# Patient Record
Sex: MALE | Race: WHITE | URBAN - METROPOLITAN AREA
[De-identification: names, ages, dates, MRNs, and addresses within clinical notes are randomized per-mention and may not be internally consistent; named-entity substitution may affect disease eponyms.]

---

## 2017-03-09 PROBLEM — I10 ESSENTIAL HYPERTENSION: Status: ACTIVE | Noted: 2017-03-09

## 2017-03-10 ENCOUNTER — OFFICE VISIT (OUTPATIENT)
Dept: FAMILY MEDICINE CLINIC | Facility: CLINIC | Age: 77
End: 2017-03-10

## 2017-03-10 VITALS
DIASTOLIC BLOOD PRESSURE: 78 MMHG | HEIGHT: 66 IN | SYSTOLIC BLOOD PRESSURE: 112 MMHG | TEMPERATURE: 98 F | WEIGHT: 154 LBS | HEART RATE: 60 BPM | BODY MASS INDEX: 24.75 KG/M2

## 2017-03-10 DIAGNOSIS — Z00.00 HEALTH CARE MAINTENANCE: Primary | ICD-10-CM

## 2017-03-10 DIAGNOSIS — I10 ESSENTIAL HYPERTENSION: ICD-10-CM

## 2017-03-10 PROCEDURE — G0439 PPPS, SUBSEQ VISIT: HCPCS | Performed by: FAMILY MEDICINE

## 2017-03-10 RX ORDER — ENALAPRIL MALEATE 10 MG/1
10 TABLET ORAL DAILY
Qty: 90 TABLET | Refills: 3 | Status: SHIPPED | OUTPATIENT
Start: 2017-03-10 | End: 2017-08-03

## 2017-03-10 NOTE — PROGRESS NOTES
[facility] [place]  PROGRESS NOTE  Chief Complaint:   No chief complaint on file.       HPI:   This is a 65year old male coming in for yearly physical.  Overall patient states he is been doing quite well takes his enalapril and daily basis for h aches, back pain, joint pain, swelling or stiffness. NEUROLOGICAL:  Denies headache, seizures, dizziness, syncope, paralysis, , numbness or tingling in the extremities,change in bowel or bladder control. HEMATOLOGIC:  Denies anemia, bleeding or bruising. 2+ dorsalis pedis pulses bilaterally. NEURO:  No deficit, normal gait, strength and tone, sensory intact, normal reflexes.     ASSESSMENT AND PLAN:   Diagnoses and all orders for this visit:    Health care maintenance  -     Comp Metabolic Panel (14) [E];

## 2017-03-16 ENCOUNTER — LAB ENCOUNTER (OUTPATIENT)
Dept: LAB | Age: 77
End: 2017-03-16
Attending: FAMILY MEDICINE
Payer: MEDICARE

## 2017-03-16 DIAGNOSIS — I10 ESSENTIAL HYPERTENSION: ICD-10-CM

## 2017-03-16 DIAGNOSIS — Z00.00 HEALTH CARE MAINTENANCE: ICD-10-CM

## 2017-03-16 LAB
ALBUMIN SERPL-MCNC: 3.9 G/DL (ref 3.5–4.8)
ALP LIVER SERPL-CCNC: 54 U/L (ref 45–117)
ALT SERPL-CCNC: 20 U/L (ref 17–63)
AST SERPL-CCNC: 16 U/L (ref 15–41)
BASOPHILS # BLD AUTO: 0.03 X10(3) UL (ref 0–0.1)
BASOPHILS NFR BLD AUTO: 0.5 %
BILIRUB SERPL-MCNC: 1.1 MG/DL (ref 0.1–2)
BUN BLD-MCNC: 13 MG/DL (ref 8–20)
CALCIUM BLD-MCNC: 9 MG/DL (ref 8.3–10.3)
CHLORIDE: 102 MMOL/L (ref 101–111)
CHOLEST SMN-MCNC: 136 MG/DL (ref ?–200)
CO2: 29 MMOL/L (ref 22–32)
COMPLEXED PSA SERPL-MCNC: 0.44 NG/ML (ref 0.01–4)
CREAT BLD-MCNC: 0.72 MG/DL (ref 0.7–1.3)
EOSINOPHIL # BLD AUTO: 0.08 X10(3) UL (ref 0–0.3)
EOSINOPHIL NFR BLD AUTO: 1.3 %
ERYTHROCYTE [DISTWIDTH] IN BLOOD BY AUTOMATED COUNT: 13.1 % (ref 11.5–16)
GLUCOSE BLD-MCNC: 84 MG/DL (ref 70–99)
HCT VFR BLD AUTO: 48.1 % (ref 37–53)
HDLC SERPL-MCNC: 56 MG/DL (ref 45–?)
HDLC SERPL: 2.43 {RATIO} (ref ?–4.97)
HGB BLD-MCNC: 16.7 G/DL (ref 13–17)
IMMATURE GRANULOCYTE COUNT: 0.07 X10(3) UL (ref 0–1)
IMMATURE GRANULOCYTE RATIO %: 1.1 %
LDLC SERPL CALC-MCNC: 72 MG/DL (ref ?–130)
LYMPHOCYTES # BLD AUTO: 1.37 X10(3) UL (ref 0.9–4)
LYMPHOCYTES NFR BLD AUTO: 21.8 %
M PROTEIN MFR SERPL ELPH: 7.1 G/DL (ref 6.1–8.3)
MCH RBC QN AUTO: 32.7 PG (ref 27–33.2)
MCHC RBC AUTO-ENTMCNC: 34.7 G/DL (ref 31–37)
MCV RBC AUTO: 94.1 FL (ref 80–99)
MONOCYTES # BLD AUTO: 0.76 X10(3) UL (ref 0.1–0.6)
MONOCYTES NFR BLD AUTO: 12.1 %
NEUTROPHIL ABS PRELIM: 3.98 X10 (3) UL (ref 1.3–6.7)
NEUTROPHILS # BLD AUTO: 3.98 X10(3) UL (ref 1.3–6.7)
NEUTROPHILS NFR BLD AUTO: 63.2 %
NONHDLC SERPL-MCNC: 80 MG/DL (ref ?–130)
PLATELET # BLD AUTO: 228 10(3)UL (ref 150–450)
POTASSIUM SERPL-SCNC: 4.2 MMOL/L (ref 3.6–5.1)
RBC # BLD AUTO: 5.11 X10(6)UL (ref 3.8–5.8)
RED CELL DISTRIBUTION WIDTH-SD: 45.6 FL (ref 35.1–46.3)
SODIUM SERPL-SCNC: 138 MMOL/L (ref 136–144)
TRIGLYCERIDES: 40 MG/DL (ref ?–150)
VLDL: 8 MG/DL (ref 5–40)
WBC # BLD AUTO: 6.3 X10(3) UL (ref 4–13)

## 2017-03-16 PROCEDURE — 85025 COMPLETE CBC W/AUTO DIFF WBC: CPT

## 2017-03-16 PROCEDURE — 80053 COMPREHEN METABOLIC PANEL: CPT

## 2017-03-16 PROCEDURE — 80061 LIPID PANEL: CPT

## 2017-03-17 ENCOUNTER — TELEPHONE (OUTPATIENT)
Dept: FAMILY MEDICINE CLINIC | Facility: CLINIC | Age: 77
End: 2017-03-17

## 2017-03-17 NOTE — TELEPHONE ENCOUNTER
----- Message from Enid Espinosa MD sent at 3/16/2017  7:39 PM CDT -----  Overall labs good  No changes in  meds  May refill x 6 months if needed

## 2017-06-28 ENCOUNTER — TELEPHONE (OUTPATIENT)
Dept: FAMILY MEDICINE CLINIC | Facility: CLINIC | Age: 77
End: 2017-06-28

## 2017-06-28 NOTE — TELEPHONE ENCOUNTER
Wife has been monitoring his BP and states it is usually normal but since Monday it has been running high. Systolic Number have been in the 190's and up. Today it was 198/90. Patient states he feels fine. Denies chest pain or headaches.   Please advise

## 2017-06-28 NOTE — TELEPHONE ENCOUNTER
Wife informed of below recommendations.  Expressed understanding   Wife will call tomorrow to set up a f/u appt with PCP

## 2017-06-28 NOTE — TELEPHONE ENCOUNTER
Chart reviewed     Recommend increase dosing of Enalopril to one tab twice daily     F/u appointment with dr. Ilene Majano.

## 2017-06-29 ENCOUNTER — OFFICE VISIT (OUTPATIENT)
Dept: FAMILY MEDICINE CLINIC | Facility: CLINIC | Age: 77
End: 2017-06-29

## 2017-06-29 VITALS
DIASTOLIC BLOOD PRESSURE: 98 MMHG | HEIGHT: 66.5 IN | TEMPERATURE: 98 F | WEIGHT: 155.5 LBS | RESPIRATION RATE: 18 BRPM | BODY MASS INDEX: 24.69 KG/M2 | HEART RATE: 66 BPM | SYSTOLIC BLOOD PRESSURE: 196 MMHG

## 2017-06-29 DIAGNOSIS — I10 ESSENTIAL HYPERTENSION: Primary | ICD-10-CM

## 2017-06-29 PROCEDURE — 99214 OFFICE O/P EST MOD 30 MIN: CPT | Performed by: FAMILY MEDICINE

## 2017-06-29 RX ORDER — AMLODIPINE BESYLATE 5 MG/1
5 TABLET ORAL DAILY
Qty: 30 TABLET | Refills: 0 | Status: SHIPPED | OUTPATIENT
Start: 2017-06-29 | End: 2017-07-06

## 2017-06-29 NOTE — PATIENT INSTRUCTIONS
Start Norvasc 1 tab each evening   Increase enalopril to 2 tabs each am  Limit caffeine and alcohol  Home BP monitor , call numbers 7/3  Daily baby asprin 81 mg     TO ER IF ANY CHEST PAIN< SHORTNESS OF BREATH OR SEVERE HEADACHE

## 2017-06-30 NOTE — PROGRESS NOTES
[address] [place] [address]  PROGRESS NOTE  Chief Complaint:   Patient presents with: Other: high BP starting on 6/26      HPI:   This is a 65year old male coming in for evaluation for elevated blood pressure.   Patient was on vacation a few weeks ago d g/dL   Sodium 138 136 - 144 mmol/L   Potassium 4.2 3.6 - 5.1 mmol/L   Chloride 102 101 - 111 mmol/L   CO2 29.0 22.0 - 32.0 mmol/L   -LIPID PANEL   Result Value Ref Range   Cholesterol, Total 136 <200 mg/dL   Triglycerides 40 <150 mg/dL   HDL Cholesterol 56 mouth 2 (two) times daily.  ) Disp: 90 tablet Rfl: 3      Counseling given: Not Answered       REVIEW OF SYSTEMS:   CONSTITUTIONAL:  Denies unusual weight gain/loss, fever, chills,  SEE HPI  EENT:  Eyes:  Denies eye pain, visual loss, blurred vision, doubl Ears: External normal. Nose: patent, no nasal discharge Throat:  No tonsillar erythema or exudate. Mouth:  No oral lesions or ulcerations, good dentition. NECK: Supple, , no JVD, no thyromegaly. SKIN: No rashes, no skin lesion, no bruising, good turgor.

## 2017-07-03 ENCOUNTER — TELEPHONE (OUTPATIENT)
Dept: FAMILY MEDICINE CLINIC | Facility: CLINIC | Age: 77
End: 2017-07-03

## 2017-07-03 NOTE — TELEPHONE ENCOUNTER
BP stabilizing , but still to high  Increase Norvasc to 10 mg each day   Pt has 5 mg tab, take 2 together each evening

## 2017-07-06 ENCOUNTER — OFFICE VISIT (OUTPATIENT)
Dept: FAMILY MEDICINE CLINIC | Facility: CLINIC | Age: 77
End: 2017-07-06

## 2017-07-06 VITALS
HEART RATE: 62 BPM | DIASTOLIC BLOOD PRESSURE: 76 MMHG | BODY MASS INDEX: 25.56 KG/M2 | SYSTOLIC BLOOD PRESSURE: 132 MMHG | HEIGHT: 65.5 IN | WEIGHT: 155.25 LBS | RESPIRATION RATE: 12 BRPM | TEMPERATURE: 98 F

## 2017-07-06 DIAGNOSIS — I10 ESSENTIAL HYPERTENSION: Primary | ICD-10-CM

## 2017-07-06 PROCEDURE — 99213 OFFICE O/P EST LOW 20 MIN: CPT | Performed by: FAMILY MEDICINE

## 2017-07-06 RX ORDER — AMLODIPINE BESYLATE 5 MG/1
TABLET ORAL
Qty: 60 TABLET | Refills: 3 | Status: SHIPPED | OUTPATIENT
Start: 2017-07-06 | End: 2017-11-13

## 2017-07-07 NOTE — PROGRESS NOTES
[address]  PROGRESS NOTE  Chief Complaint:   Patient presents with: Follow - Up: blood pressure follow up      HPI:   This is a 65year old male coming in for follow-up of hypertension.   Patient started on Norvasc 5 mg to his previous 228.0 150.0 - 450.0 10(3)uL   MCV 94.1 80.0 - 99.0 fL   MCH 32.7 27.0 - 33.2 pg   MCHC 34.7 31.0 - 37.0 g/dL   RDW 13.1 11.5 - 16.0 %   RDW-SD 45.6 35.1 - 46.3 fL   Neutrophil Absolute Prelim 3.98 1.30 - 6.70 x10 (3) uL   Neutrophil Absolute 3.98 1.30 - 6. sputum. GASTROINTESTINAL:  Denies abdominal pain, nausea, vomiting, constipation, diarrhea, or blood in stool. MUSCULOSKELETAL:  Denies weakness, muscle aches, back pain, joint pain, swelling or stiffness.   NEUROLOGICAL:  Denies headache, seizures, dizzi visit:    Essential hypertension    Other orders  -     AmLODIPine Besylate 5 MG Oral Tab; 2 tabs each evening    A/P: Hypertension improved with addition of Norvasc 10 mg daily.   Recommend patient continue with current medications increase in symptoms hari

## 2017-07-21 ENCOUNTER — TELEPHONE (OUTPATIENT)
Dept: FAMILY MEDICINE CLINIC | Facility: CLINIC | Age: 77
End: 2017-07-21

## 2017-07-21 NOTE — TELEPHONE ENCOUNTER
Patient's wife Ashley Celestin calling with 2 weeks of BP readings as requested by Dr. Riley Pleasant:    7/7  -   152/74  7/8  -   147/74  7/9  -   157/67  7/10  -  153/68  7/11  -  142/67  7/12  -  141/66  7/13  -  149/74  7/14  -  169/76  7/15  -  142/65  7/16  -  137/6

## 2017-07-21 NOTE — TELEPHONE ENCOUNTER
Blood pressures a little elevated, but stable. Please let patient know and that he needs to keep his recheck with Dr. Lela Sinclair.  Teodora Winters, 07/21/17, 12:11 PM

## 2017-08-03 ENCOUNTER — TELEPHONE (OUTPATIENT)
Dept: FAMILY MEDICINE CLINIC | Facility: CLINIC | Age: 77
End: 2017-08-03

## 2017-08-03 RX ORDER — ENALAPRIL MALEATE 10 MG/1
10 TABLET ORAL 2 TIMES DAILY
Qty: 180 TABLET | Refills: 3 | Status: SHIPPED | OUTPATIENT
Start: 2017-08-03 | End: 2017-08-17

## 2017-08-03 RX ORDER — HYDROCHLOROTHIAZIDE 25 MG/1
25 TABLET ORAL DAILY
Qty: 30 TABLET | Refills: 3 | Status: SHIPPED | OUTPATIENT
Start: 2017-08-03 | End: 2017-11-28

## 2017-08-03 NOTE — TELEPHONE ENCOUNTER
BP still higher than desired  Will add HCTZ 25 mg q am, orders in  BP check 2-3 weeks here  Continue home BP monitoring, bring numbers with

## 2017-08-03 NOTE — TELEPHONE ENCOUNTER
LOV: 7/6/17  Last Refill:3/10/17    Wife states that pt blood pressure is still elevated most days- yesterday 151/67 and today 137/60. Wife wanted to verify that he is taking the correct dosage and see if dr wanted to make any changes to meds?

## 2017-08-03 NOTE — TELEPHONE ENCOUNTER
Pt wife called back- I advised that a new medication has been added. I also advised to return to the office in 2-3 weeks for a BP check and to bring numbers from home monitoring in. Pt wife verbalized understanding.     Ashley Parra, 08/03/17, 2:44 PM

## 2017-08-17 ENCOUNTER — OFFICE VISIT (OUTPATIENT)
Dept: FAMILY MEDICINE CLINIC | Facility: CLINIC | Age: 77
End: 2017-08-17

## 2017-08-17 VITALS
WEIGHT: 156 LBS | HEART RATE: 58 BPM | HEIGHT: 66 IN | TEMPERATURE: 98 F | BODY MASS INDEX: 25.07 KG/M2 | RESPIRATION RATE: 12 BRPM | SYSTOLIC BLOOD PRESSURE: 126 MMHG | DIASTOLIC BLOOD PRESSURE: 70 MMHG

## 2017-08-17 DIAGNOSIS — I10 ESSENTIAL HYPERTENSION: Primary | ICD-10-CM

## 2017-08-17 PROCEDURE — 99213 OFFICE O/P EST LOW 20 MIN: CPT | Performed by: FAMILY MEDICINE

## 2017-08-17 RX ORDER — ENALAPRIL MALEATE 10 MG/1
10 TABLET ORAL 2 TIMES DAILY
Qty: 180 TABLET | Refills: 3 | Status: SHIPPED | OUTPATIENT
Start: 2017-08-17 | End: 2018-02-02

## 2017-08-17 NOTE — PATIENT INSTRUCTIONS
Continue current meds  Take hydrochlorthiazide 25 mg in am  With enalopril  Take Norvasc in the evening 5 mg

## 2017-08-17 NOTE — PROGRESS NOTES
[facility] [place]  PROGRESS NOTE  Chief Complaint:   Patient presents with:  Blood Pressure: 6 week check up       HPI:   This is a 65year old male coming in for follow-up of his hypertension.   Patient is feeling much better since his medicati Neutrophil Absolute 3.98 1.30 - 6.70 x10(3) uL   Lymphocyte Absolute 1.37 0.90 - 4.00 x10(3) uL   Monocyte Absolute 0.76 (H) 0.10 - 0.60 x10(3) uL   Eosinophil Absolute 0.08 0.00 - 0.30 x10(3) uL   Basophil Absolute 0.03 0.00 - 0.10 x10(3) uL   Immature weakness, muscle aches, back pain, joint pain, swelling or stiffness. NEUROLOGICAL:  Denies headache, seizures, dizziness, syncope, paralysis, , numbness or tingling in the extremities,change in bowel or bladder control.   HEMATOLOGIC:  Denies anemia, blee Oral Tab; Take 1 tablet (10 mg total) by mouth 2 (two) times daily. A/P: Patient here for follow-up of hypertension. Prior medication adjustments his blood pressures been running 170 190/100.   Now patient is doing very well with blood pressures running

## 2017-11-02 ENCOUNTER — TELEPHONE (OUTPATIENT)
Dept: FAMILY MEDICINE CLINIC | Facility: CLINIC | Age: 77
End: 2017-11-02

## 2017-11-02 NOTE — TELEPHONE ENCOUNTER
Pt's wife had a question regarding mail order. She stated that she was told a few weeks ago that it was too soon to refill pt's enalapril by the mail order pharmacy. She states that he only has about 2 weeks worth on meds left.  I advised her to call the ma

## 2017-11-13 ENCOUNTER — TELEPHONE (OUTPATIENT)
Dept: FAMILY MEDICINE CLINIC | Facility: CLINIC | Age: 77
End: 2017-11-13

## 2017-11-13 NOTE — TELEPHONE ENCOUNTER
Verbal permission given from patient to speak with his wife [place] Session as he cannot hear very well. Wife states patient is almost out of his Amlodipine; has enough for tomorrow.   Wife wondering if Dr. Evelyn Stein wanted patient to continue taking this medicatio

## 2017-11-14 RX ORDER — AMLODIPINE BESYLATE 5 MG/1
TABLET ORAL
Qty: 60 TABLET | Refills: 0 | Status: SHIPPED | OUTPATIENT
Start: 2017-11-14 | End: 2017-12-04

## 2017-11-14 NOTE — TELEPHONE ENCOUNTER
Dr. Nicolas Rizzo is out of the office today. Prescription sent to pharmacy.  Teodora Winters, 11/14/17, 9:57 AM

## 2017-11-28 ENCOUNTER — TELEPHONE (OUTPATIENT)
Dept: FAMILY MEDICINE CLINIC | Facility: CLINIC | Age: 77
End: 2017-11-28

## 2017-11-28 RX ORDER — HYDROCHLOROTHIAZIDE 25 MG/1
25 TABLET ORAL DAILY
Qty: 30 TABLET | Refills: 3 | Status: SHIPPED | OUTPATIENT
Start: 2017-11-28 | End: 2017-12-04

## 2017-11-28 NOTE — TELEPHONE ENCOUNTER
Pt out of hydrochlorothiazide. Not sure if he needs a refill or not. Last appt 8/17/17, told to follow up in 3 months. From last appt: \"This is a 65year old male coming in for follow-up of his hypertension.   Patient is feeling much better since h

## 2017-11-28 NOTE — TELEPHONE ENCOUNTER
Spoke w/ Anthony Franco pt's wife because he is hard of hearing. Casimir Bloch was in the room with Anthony Franco. Informed her of recommendations and reminded her of upcoming appt.

## 2017-12-04 ENCOUNTER — OFFICE VISIT (OUTPATIENT)
Dept: FAMILY MEDICINE CLINIC | Facility: CLINIC | Age: 77
End: 2017-12-04

## 2017-12-04 VITALS
HEIGHT: 66 IN | TEMPERATURE: 98 F | DIASTOLIC BLOOD PRESSURE: 72 MMHG | RESPIRATION RATE: 14 BRPM | HEART RATE: 62 BPM | SYSTOLIC BLOOD PRESSURE: 120 MMHG | BODY MASS INDEX: 24.61 KG/M2 | WEIGHT: 153.13 LBS

## 2017-12-04 DIAGNOSIS — I10 ESSENTIAL HYPERTENSION: Primary | ICD-10-CM

## 2017-12-04 DIAGNOSIS — R26.89 BALANCE DISORDER: ICD-10-CM

## 2017-12-04 PROCEDURE — 99214 OFFICE O/P EST MOD 30 MIN: CPT | Performed by: FAMILY MEDICINE

## 2017-12-04 PROCEDURE — G0008 ADMIN INFLUENZA VIRUS VAC: HCPCS | Performed by: FAMILY MEDICINE

## 2017-12-04 PROCEDURE — 90653 IIV ADJUVANT VACCINE IM: CPT | Performed by: FAMILY MEDICINE

## 2017-12-04 RX ORDER — AMLODIPINE BESYLATE 5 MG/1
TABLET ORAL
Qty: 60 TABLET | Refills: 6 | Status: SHIPPED | OUTPATIENT
Start: 2017-12-04 | End: 2018-05-09

## 2017-12-04 RX ORDER — HYDROCHLOROTHIAZIDE 25 MG/1
25 TABLET ORAL DAILY
Qty: 30 TABLET | Refills: 6 | Status: SHIPPED | OUTPATIENT
Start: 2017-12-04 | End: 2018-07-24

## 2017-12-04 NOTE — PROGRESS NOTES
[facility] [place]  PROGRESS NOTE  Chief Complaint:   Patient presents with:  Blood Pressure      HPI:   This is a 65year old male coming in for follow-up of blood pressure medications.   Since last increase in medication patient's noted that so mmol/L   -LIPID PANEL   Result Value Ref Range   Cholesterol, Total 136 <200 mg/dL   Triglycerides 40 <150 mg/dL   HDL Cholesterol 56 >45 mg/dL   LDL Cholesterol 72 <130 mg/dL   VLDL 8 5 - 40 mg/dL   Chol/HDL Ratio 2.43 <4.97   Non HDL Chol 80 <130 mg/dL (10 mg total) by mouth 2 (two) times daily. Disp: 180 tablet Rfl: 3      Counseling given: Not Answered       REVIEW OF SYSTEMS:   CONSTITUTIONAL:  Denies unusual weight gain/loss, fever, chills, or fatigue.  SEE HPI  EENT:  Eyes:  Denies eye pain, visual l conjunctivae clear bilaterally, no eye discharge Ears: External normal. Nose: patent, no nasal discharge Throat:  No tonsillar erythema or exudate. Mouth:  No oral lesions or ulcerations, good dentition. NECK: Supple, , no JVD, no thyromegaly.   SKIN: No call with any questions, complications, allergies, or worsening or changing symptoms. Patient is to call with any side effects or complications from the treatments as a result of today.      Problem List:  Patient Active Problem List:     Essential hyperte

## 2017-12-04 NOTE — PATIENT INSTRUCTIONS
Increase water intake, 3-5 glasses each day  Split amlodopine, 1 tab morning , 1  In the evening  Continue regular walking  Refill meds as needed

## 2018-02-02 ENCOUNTER — TELEPHONE (OUTPATIENT)
Dept: FAMILY MEDICINE CLINIC | Facility: CLINIC | Age: 78
End: 2018-02-02

## 2018-02-02 RX ORDER — ENALAPRIL MALEATE 10 MG/1
10 TABLET ORAL 2 TIMES DAILY
Qty: 180 TABLET | Refills: 3 | Status: SHIPPED | OUTPATIENT
Start: 2018-02-02 | End: 2018-02-08

## 2018-02-02 NOTE — TELEPHONE ENCOUNTER
Please advise refill. Spoke with pt's wife and she stated that he only has 15 enalapril left and would like a refill sent to [facility]. Last Rx 8/17/17 refilled x 1 year. Should still have refills.     Future appt:  None  Last Appointment:  12/4/2017    Tim

## 2018-02-08 ENCOUNTER — TELEPHONE (OUTPATIENT)
Dept: FAMILY MEDICINE CLINIC | Facility: CLINIC | Age: 78
End: 2018-02-08

## 2018-02-08 RX ORDER — ENALAPRIL MALEATE 10 MG/1
10 TABLET ORAL 2 TIMES DAILY
Qty: 180 TABLET | Refills: 3 | Status: SHIPPED | OUTPATIENT
Start: 2018-02-08 | End: 2018-02-16

## 2018-02-08 NOTE — TELEPHONE ENCOUNTER
Refill was sent to wrong pharmacy on 2/2/18. Sent to correct pharmacy today. Pt's wife notified and verbalized understanding. She will call for short refill if he needs one.

## 2018-02-16 ENCOUNTER — TELEPHONE (OUTPATIENT)
Dept: FAMILY MEDICINE CLINIC | Facility: CLINIC | Age: 78
End: 2018-02-16

## 2018-02-16 RX ORDER — ENALAPRIL MALEATE 10 MG/1
10 TABLET ORAL 2 TIMES DAILY
Qty: 180 TABLET | Refills: 1 | Status: SHIPPED | OUTPATIENT
Start: 2018-02-16 | End: 2018-03-15

## 2018-02-16 NOTE — TELEPHONE ENCOUNTER
Spoke with wife and she states she received a letter today from [facility] dated 2/8/18 that it is too soon to refill his enalapril. Doctor Deny Draper sent a new script on 2/8/18 with updated dosage.    Wife informed I would try to contact [facility] to find out

## 2018-02-16 NOTE — TELEPHONE ENCOUNTER
Script resent to mail order with explanation  Should receive med in mid march before other script out

## 2018-03-15 ENCOUNTER — OFFICE VISIT (OUTPATIENT)
Dept: FAMILY MEDICINE CLINIC | Facility: CLINIC | Age: 78
End: 2018-03-15

## 2018-03-15 VITALS
WEIGHT: 156 LBS | SYSTOLIC BLOOD PRESSURE: 132 MMHG | TEMPERATURE: 99 F | BODY MASS INDEX: 25.68 KG/M2 | HEART RATE: 72 BPM | HEIGHT: 65.5 IN | DIASTOLIC BLOOD PRESSURE: 72 MMHG

## 2018-03-15 DIAGNOSIS — Z00.00 HEALTH CARE MAINTENANCE: Primary | ICD-10-CM

## 2018-03-15 DIAGNOSIS — D12.3 ADENOMATOUS POLYP OF TRANSVERSE COLON: ICD-10-CM

## 2018-03-15 DIAGNOSIS — H90.6 MIXED CONDUCTIVE AND SENSORINEURAL HEARING LOSS OF BOTH EARS: ICD-10-CM

## 2018-03-15 DIAGNOSIS — Z12.5 ENCOUNTER FOR SCREENING FOR MALIGNANT NEOPLASM OF PROSTATE: ICD-10-CM

## 2018-03-15 DIAGNOSIS — I10 ESSENTIAL HYPERTENSION: ICD-10-CM

## 2018-03-15 PROBLEM — R26.89 BALANCE DISORDER: Status: RESOLVED | Noted: 2017-12-04 | Resolved: 2018-03-15

## 2018-03-15 PROCEDURE — G0009 ADMIN PNEUMOCOCCAL VACCINE: HCPCS | Performed by: FAMILY MEDICINE

## 2018-03-15 PROCEDURE — 90670 PCV13 VACCINE IM: CPT | Performed by: FAMILY MEDICINE

## 2018-03-15 PROCEDURE — G0439 PPPS, SUBSEQ VISIT: HCPCS | Performed by: FAMILY MEDICINE

## 2018-03-15 RX ORDER — ENALAPRIL MALEATE 10 MG/1
10 TABLET ORAL 2 TIMES DAILY
Qty: 180 TABLET | Refills: 1 | Status: SHIPPED | OUTPATIENT
Start: 2018-03-15 | End: 2018-09-07

## 2018-03-15 RX ORDER — ERYTHROMYCIN 5 MG/G
OINTMENT OPHTHALMIC
COMMUNITY
Start: 2018-03-09 | End: 2018-10-04 | Stop reason: ALTCHOICE

## 2018-03-15 NOTE — PATIENT INSTRUCTIONS
Continue current meds  Schedule fasting labs, 12 hours water only  Due for colonoscopy, see referral  Refill meds as needed  Resume daily walks  Restart asprin 81 mg daily

## 2018-03-16 NOTE — PROGRESS NOTES
[facility] [place]  PROGRESS NOTE  Chief Complaint:   Patient presents with:  Physical      HPI:   This is a 77year old male coming in for yearly physical.  Patient overall feels like she is doing very well.   Patient's had increasing difficulti -LIPID PANEL   Result Value Ref Range   Cholesterol, Total 136 <200 mg/dL   Triglycerides 40 <150 mg/dL   HDL Cholesterol 56 >45 mg/dL   LDL Cholesterol 72 <130 mg/dL   VLDL 8 5 - 40 mg/dL   Chol/HDL Ratio 2.43 <4.97   Non HDL Chol 80 <130 mg/dL   -PSA S tablet Rfl: 6   erythromycin 5 MG/GM Ophthalmic Ointment  Disp:  Rfl:    aspirin 81 MG Oral Tab Take 81 mg by mouth daily.  Disp:  Rfl:       Counseling given: Not Answered       REVIEW OF SYSTEMS:   CONSTITUTIONAL:  Denies unusual weight gain/loss, fever, icterus, conjunctivae clear bilaterally, no eye discharge Ears: External normal. Nose: patent, no nasal discharge Throat:  No tonsillar erythema or exudate. Mouth:  No oral lesions or ulcerations, good dentition. NECK: Supple, , no JVD, no thyromegaly. follow-up in 6 months.     Health Maintenance:  Pneumococcal PPSV23/PCV13 65+ Years / Low and Medium Risk(1 of 2 - PCV13) due on 03/06/2004  Fall Risk Screening due on 03/10/2018  Annual Physical due on 03/10/2018    Patient/Caregiver Education: Patient/Car

## 2018-04-28 ENCOUNTER — LABORATORY ENCOUNTER (OUTPATIENT)
Dept: LAB | Age: 78
End: 2018-04-28
Attending: FAMILY MEDICINE
Payer: MEDICARE

## 2018-04-28 DIAGNOSIS — Z12.5 ENCOUNTER FOR SCREENING FOR MALIGNANT NEOPLASM OF PROSTATE: ICD-10-CM

## 2018-04-28 DIAGNOSIS — I10 ESSENTIAL HYPERTENSION: ICD-10-CM

## 2018-04-28 DIAGNOSIS — Z00.00 HEALTH CARE MAINTENANCE: ICD-10-CM

## 2018-04-28 DIAGNOSIS — D12.3 ADENOMATOUS POLYP OF TRANSVERSE COLON: ICD-10-CM

## 2018-04-28 PROCEDURE — 80061 LIPID PANEL: CPT

## 2018-04-28 PROCEDURE — 36415 COLL VENOUS BLD VENIPUNCTURE: CPT

## 2018-04-28 PROCEDURE — 80053 COMPREHEN METABOLIC PANEL: CPT

## 2018-04-28 PROCEDURE — 85025 COMPLETE CBC W/AUTO DIFF WBC: CPT

## 2018-04-30 ENCOUNTER — TELEPHONE (OUTPATIENT)
Dept: FAMILY MEDICINE CLINIC | Facility: CLINIC | Age: 78
End: 2018-04-30

## 2018-04-30 NOTE — TELEPHONE ENCOUNTER
----- Message from Theodore Nguyen MD sent at 4/29/2018  2:11 PM CDT -----  Labs all great  No changes in meds  Resume daily walks

## 2018-05-10 RX ORDER — AMLODIPINE BESYLATE 5 MG/1
TABLET ORAL
Qty: 180 TABLET | Refills: 1 | Status: SHIPPED | OUTPATIENT
Start: 2018-05-10 | End: 2019-01-03

## 2018-05-10 NOTE — TELEPHONE ENCOUNTER
Future appt:     Last Appointment:  3/15/2018; Return in about 6 months (around 9/15/2018).        Cholesterol, Total (mg/dL)   Date Value   04/28/2018 154   ----------  HDL Cholesterol (mg/dL)   Date Value   04/28/2018 58   ----------  LDL Cholesterol (mg/

## 2018-06-04 RX ORDER — AMLODIPINE BESYLATE 5 MG/1
TABLET ORAL
Qty: 180 TABLET | Refills: 1 | Status: SHIPPED | OUTPATIENT
Start: 2018-06-04 | End: 2018-10-04

## 2018-06-04 NOTE — TELEPHONE ENCOUNTER
Please advise refill of amlodipine 5mg. This was recently refilled on 5/10/18. I called the pharmacy and they state that they never received the refill in May.

## 2018-07-09 ENCOUNTER — TELEPHONE (OUTPATIENT)
Dept: FAMILY MEDICINE CLINIC | Facility: CLINIC | Age: 78
End: 2018-07-09

## 2018-07-09 NOTE — TELEPHONE ENCOUNTER
Future appt:    Last Appointment:  3/15/2018-  F/u due in 6 months  Wife informed to call back to schedule appt for Sept.    Wife Jatin (consent on file) informed that Norvasc was refilled back on 6/4/18 for 6 months. Wife urged to call pharmacy for refill.

## 2018-07-24 RX ORDER — HYDROCHLOROTHIAZIDE 25 MG/1
TABLET ORAL
Qty: 90 TABLET | Refills: 1 | Status: SHIPPED | OUTPATIENT
Start: 2018-07-24 | End: 2019-01-03

## 2018-07-24 NOTE — TELEPHONE ENCOUNTER
Please advise refill of HCTZ 25mg. Last Rx: 12/4/17    Future appt:    Last Appointment:  3/15/2018 per last office visit notes pt is to f/u in 6 months around 9/15/2018.     Cholesterol, Total (mg/dL)   Date Value   04/28/2018 154   ----------  HDL Choles

## 2018-08-23 ENCOUNTER — TELEPHONE (OUTPATIENT)
Dept: FAMILY MEDICINE CLINIC | Facility: CLINIC | Age: 78
End: 2018-08-23

## 2018-08-23 NOTE — TELEPHONE ENCOUNTER
HCTZ was refilled last month for 6 months. I called pharmacy to verify that they receive the script and per Nasrin Lawman they have. Tyler Olivo notified and verbalized understanding.

## 2018-09-07 NOTE — TELEPHONE ENCOUNTER
Please advise refill of Enalapril 10mg.   Last Rx: 3/15/18    Future appt:  None  Last Appointment:  3/15/2018 per office visit notes pt is to f/u in 6 months around 9/15/18    Cholesterol, Total (mg/dL)   Date Value   04/28/2018 154   ----------  HDL Patricia

## 2018-09-08 NOTE — TELEPHONE ENCOUNTER
Left message for pt to call the office regarding refill. Per Dr. Ally Barlow:  Note states in March that he is to come in for OV with dr. Chacho Dominguez in September.      I do not see that he has made appointment - ok for refill if he makes appointment (Routing comm

## 2018-09-10 RX ORDER — ENALAPRIL MALEATE 10 MG/1
TABLET ORAL
Qty: 180 TABLET | Refills: 1 | Status: SHIPPED | OUTPATIENT
Start: 2018-09-10 | End: 2018-10-24

## 2018-09-10 NOTE — TELEPHONE ENCOUNTER
Appt scheduled as recommended.     Future Appointments   Date Time Provider Brock Osorio   10/4/2018  9:30 AM Sita Brandon MD [facility]

## 2018-10-04 ENCOUNTER — OFFICE VISIT (OUTPATIENT)
Dept: FAMILY MEDICINE CLINIC | Facility: CLINIC | Age: 78
End: 2018-10-04
Payer: MEDICARE

## 2018-10-04 VITALS
DIASTOLIC BLOOD PRESSURE: 62 MMHG | SYSTOLIC BLOOD PRESSURE: 130 MMHG | BODY MASS INDEX: 25.33 KG/M2 | WEIGHT: 152 LBS | HEIGHT: 65 IN | HEART RATE: 64 BPM

## 2018-10-04 DIAGNOSIS — H90.6 MIXED CONDUCTIVE AND SENSORINEURAL HEARING LOSS OF BOTH EARS: ICD-10-CM

## 2018-10-04 DIAGNOSIS — I10 ESSENTIAL HYPERTENSION: Primary | ICD-10-CM

## 2018-10-04 DIAGNOSIS — M79.671 FOOT PAIN, RIGHT: ICD-10-CM

## 2018-10-04 PROCEDURE — 90653 IIV ADJUVANT VACCINE IM: CPT | Performed by: FAMILY MEDICINE

## 2018-10-04 PROCEDURE — G0008 ADMIN INFLUENZA VIRUS VAC: HCPCS | Performed by: FAMILY MEDICINE

## 2018-10-04 PROCEDURE — 99214 OFFICE O/P EST MOD 30 MIN: CPT | Performed by: FAMILY MEDICINE

## 2018-10-04 NOTE — PROGRESS NOTES
[facility] [place]  PROGRESS NOTE  Chief Complaint:   Patient presents with:  Hypertension: 6 month f/u      HPI:   This is a 77year old male coming in for follow-up of his hypertension. Patient takes his medication faithfully.   Patient really g/dL    Albumin 3.8 3.5 - 4.8 g/dL    Sodium 135 (L) 136 - 144 mmol/L    Potassium 4.1 3.6 - 5.1 mmol/L    Chloride 100 (L) 101 - 111 mmol/L    CO2 27.0 22.0 - 32.0 mmol/L   LIPID PANEL   Result Value Ref Range    Cholesterol, Total 154 <200 mg/dL    Trigl Disp: 180 tablet Rfl: 1   HYDROCHLOROTHIAZIDE 25 MG Oral Tab TAKE 1 TABLET(25 MG) BY MOUTH DAILY Disp: 90 tablet Rfl: 1   AMLODIPINE BESYLATE 5 MG Oral Tab TAKE 1 TABLET BY MOUTH IN THE MORNING AND AT NIGHT Disp: 180 tablet Rfl: 1   aspirin 81 MG Oral Tab groomed. Physical Exam:  GEN:  Patient is alert, awake and oriented, well developed, well nourished no apparent distress.   HEENT:  Head:  Normocephalic, atraumatic Eyes: EOMI, PERRLA, no scleral icterus, conjunctivae clear bilaterally, no eye discharge Ea the lumbar spine for evaluation. Patient received his flu vaccine today and can call back to get the x-rays of the back if he wants to pursue this. Patient be due for his physical was next spring with labs.     Health Maintenance:  Influenza Vaccine(1) du

## 2018-10-04 NOTE — PATIENT INSTRUCTIONS
meds refilled  Flu vaccine today  Consider getting xrays of low back, suspect this is cause of foot pain  Resume walking again  Due for physical after 3/15/2019

## 2018-10-24 ENCOUNTER — TELEPHONE (OUTPATIENT)
Dept: FAMILY MEDICINE CLINIC | Facility: CLINIC | Age: 78
End: 2018-10-24

## 2018-10-24 RX ORDER — ENALAPRIL MALEATE 10 MG/1
10 TABLET ORAL 2 TIMES DAILY
Qty: 180 TABLET | Refills: 1 | Status: SHIPPED | OUTPATIENT
Start: 2018-10-24 | End: 2019-02-26

## 2019-01-03 RX ORDER — HYDROCHLOROTHIAZIDE 25 MG/1
TABLET ORAL
Qty: 90 TABLET | Refills: 1 | Status: SHIPPED | OUTPATIENT
Start: 2019-01-03 | End: 2019-08-13

## 2019-01-03 RX ORDER — AMLODIPINE BESYLATE 5 MG/1
TABLET ORAL
Qty: 180 TABLET | Refills: 1 | Status: SHIPPED | OUTPATIENT
Start: 2019-01-03 | End: 2019-07-01

## 2019-01-03 NOTE — TELEPHONE ENCOUNTER
Please advise refills of Amlodipine and HCTZ. Last Rx: 5/10/18 amlodipine, 7/24/18 HCTZ    Future appt:    Last Appointment:  10/4/2018 per office visit notes pt is to f/u in 6 months around 4/4/18.       Cholesterol, Total (mg/dL)   Date Value   04/28/201

## 2019-02-14 ENCOUNTER — TELEPHONE (OUTPATIENT)
Dept: FAMILY MEDICINE CLINIC | Facility: CLINIC | Age: 79
End: 2019-02-14

## 2019-02-14 NOTE — TELEPHONE ENCOUNTER
Left message for Deirdre Cervantes to call the office. [facility]

## 2019-02-14 NOTE — TELEPHONE ENCOUNTER
needs refill for HCTZ- pharmacy does not have anything on file for refills - please send to [facility]

## 2019-02-18 NOTE — TELEPHONE ENCOUNTER
Spoke with Pharmacy tech Melsisa Workman and was informed that patient has 2 RFs on file. Patient's wife Jeremy Robledo informed.

## 2019-02-26 RX ORDER — ENALAPRIL MALEATE 10 MG/1
TABLET ORAL
Qty: 180 TABLET | Refills: 1 | Status: SHIPPED | OUTPATIENT
Start: 2019-02-26 | End: 2019-09-26

## 2019-02-26 NOTE — TELEPHONE ENCOUNTER
Future Appointments   Date Time Provider Brock Osorio   3/21/2019  9:15 AM José Osei MD [facility] [place] [facility]

## 2019-03-21 ENCOUNTER — OFFICE VISIT (OUTPATIENT)
Dept: FAMILY MEDICINE CLINIC | Facility: CLINIC | Age: 79
End: 2019-03-21
Payer: MEDICARE

## 2019-03-21 ENCOUNTER — APPOINTMENT (OUTPATIENT)
Dept: LAB | Age: 79
End: 2019-03-21
Attending: FAMILY MEDICINE
Payer: MEDICARE

## 2019-03-21 VITALS
DIASTOLIC BLOOD PRESSURE: 72 MMHG | HEIGHT: 65 IN | RESPIRATION RATE: 18 BRPM | HEART RATE: 64 BPM | TEMPERATURE: 98 F | SYSTOLIC BLOOD PRESSURE: 136 MMHG | WEIGHT: 154.19 LBS | BODY MASS INDEX: 25.69 KG/M2

## 2019-03-21 DIAGNOSIS — Z00.00 HEALTH CARE MAINTENANCE: Primary | ICD-10-CM

## 2019-03-21 DIAGNOSIS — I10 ESSENTIAL HYPERTENSION: ICD-10-CM

## 2019-03-21 DIAGNOSIS — Z12.5 ENCOUNTER FOR SCREENING FOR MALIGNANT NEOPLASM OF PROSTATE: ICD-10-CM

## 2019-03-21 LAB
ALBUMIN SERPL-MCNC: 3.9 G/DL (ref 3.4–5)
ALBUMIN/GLOB SERPL: 1.1 {RATIO} (ref 1–2)
ALP LIVER SERPL-CCNC: 65 U/L (ref 45–117)
ALT SERPL-CCNC: 23 U/L (ref 16–61)
ANION GAP SERPL CALC-SCNC: 7 MMOL/L (ref 0–18)
AST SERPL-CCNC: 17 U/L (ref 15–37)
BASOPHILS # BLD AUTO: 0.05 X10(3) UL (ref 0–0.2)
BASOPHILS NFR BLD AUTO: 0.7 %
BILIRUB SERPL-MCNC: 1 MG/DL (ref 0.1–2)
BUN BLD-MCNC: 9 MG/DL (ref 7–18)
BUN/CREAT SERPL: 13.2 (ref 10–20)
CALCIUM BLD-MCNC: 9 MG/DL (ref 8.5–10.1)
CHLORIDE SERPL-SCNC: 97 MMOL/L (ref 98–107)
CO2 SERPL-SCNC: 27 MMOL/L (ref 21–32)
COMPLEXED PSA SERPL-MCNC: 0.5 NG/ML (ref ?–4)
CREAT BLD-MCNC: 0.68 MG/DL (ref 0.7–1.3)
DEPRECATED RDW RBC AUTO: 43.6 FL (ref 35.1–46.3)
EOSINOPHIL # BLD AUTO: 0.07 X10(3) UL (ref 0–0.7)
EOSINOPHIL NFR BLD AUTO: 1 %
ERYTHROCYTE [DISTWIDTH] IN BLOOD BY AUTOMATED COUNT: 12.6 % (ref 11–15)
GLOBULIN PLAS-MCNC: 3.6 G/DL (ref 2.8–4.4)
GLUCOSE BLD-MCNC: 90 MG/DL (ref 70–99)
HCT VFR BLD AUTO: 47.3 % (ref 39–53)
HGB BLD-MCNC: 16.4 G/DL (ref 13–17.5)
IMM GRANULOCYTES # BLD AUTO: 0.06 X10(3) UL (ref 0–1)
IMM GRANULOCYTES NFR BLD: 0.9 %
LYMPHOCYTES # BLD AUTO: 1.41 X10(3) UL (ref 1–4)
LYMPHOCYTES NFR BLD AUTO: 20.2 %
M PROTEIN MFR SERPL ELPH: 7.5 G/DL (ref 6.4–8.2)
MCH RBC QN AUTO: 32.5 PG (ref 26–34)
MCHC RBC AUTO-ENTMCNC: 34.7 G/DL (ref 31–37)
MCV RBC AUTO: 93.8 FL (ref 80–100)
MONOCYTES # BLD AUTO: 0.87 X10(3) UL (ref 0.1–1)
MONOCYTES NFR BLD AUTO: 12.5 %
NEUTROPHILS # BLD AUTO: 4.51 X10 (3) UL (ref 1.5–7.7)
NEUTROPHILS # BLD AUTO: 4.51 X10(3) UL (ref 1.5–7.7)
NEUTROPHILS NFR BLD AUTO: 64.7 %
OSMOLALITY SERPL CALC.SUM OF ELEC: 270 MOSM/KG (ref 275–295)
PLATELET # BLD AUTO: 249 10(3)UL (ref 150–450)
POTASSIUM SERPL-SCNC: 4 MMOL/L (ref 3.5–5.1)
RBC # BLD AUTO: 5.04 X10(6)UL (ref 3.8–5.8)
SODIUM SERPL-SCNC: 131 MMOL/L (ref 136–145)
WBC # BLD AUTO: 7 X10(3) UL (ref 4–11)

## 2019-03-21 PROCEDURE — 80053 COMPREHEN METABOLIC PANEL: CPT | Performed by: FAMILY MEDICINE

## 2019-03-21 PROCEDURE — 85025 COMPLETE CBC W/AUTO DIFF WBC: CPT | Performed by: FAMILY MEDICINE

## 2019-03-21 PROCEDURE — G0439 PPPS, SUBSEQ VISIT: HCPCS | Performed by: FAMILY MEDICINE

## 2019-03-21 PROCEDURE — 36415 COLL VENOUS BLD VENIPUNCTURE: CPT | Performed by: FAMILY MEDICINE

## 2019-03-21 NOTE — PROGRESS NOTES
[address]  PROGRESS NOTE  Chief Complaint:   Patient presents with:   Well Adult      HPI:   This is a [de-identified]year old male coming in for yearly physical.  Patient's only chronic medical problem has been hypertension which has been well cont SCREEN   Result Value Ref Range    Prostate Specific Antigen Screen 0.46 0.01 - 4.00 ng/mL   CBC W/ DIFFERENTIAL   Result Value Ref Range    WBC 5.6 4.0 - 13.0 x10(3) uL    RBC 4.81 3.80 - 5.80 x10(6)uL    HGB 15.6 13.0 - 17.0 g/dL    HCT 45.7 37.0 - 53.0 81 MG Oral Tab Take 81 mg by mouth daily. Disp:  Rfl:       Counseling given: Not Answered       REVIEW OF SYSTEMS:   CONSTITUTIONAL:  Denies unusual weight gain/loss, fever, chills, or fatigue.  SEE HPI  EENT:  Eyes:  Denies eye pain, visual loss, blurred as of this encounter: 65\". Weight as of this encounter: 154 lb 3.2 oz. Vital signs reviewed. Appears stated age, well groomed. Physical Exam:  GEN:  Patient is alert, awake and oriented, well developed, well nourished, no apparent distress.   HEENT: laboratory studies today. Patient is encouraged to resume his walking is a slightly well up his blood pressure and possibly allow him to get rid of some medication. Will refill meds as needed follow-up in 6 months.     Health Maintenance:  Fall Risk Scree

## 2019-03-22 ENCOUNTER — TELEPHONE (OUTPATIENT)
Dept: FAMILY MEDICINE CLINIC | Facility: CLINIC | Age: 79
End: 2019-03-22

## 2019-03-22 NOTE — TELEPHONE ENCOUNTER
Wife notified of lab results and Dr. August Double instructions ( patient hard of hearing and unable to speak on phone- concent on file)

## 2019-03-22 NOTE — TELEPHONE ENCOUNTER
----- Message from Idalmis Garcia MD sent at 3/22/2019  7:19 AM CDT -----  Overall labs good  Sodium level sl low  May use some salt on food, sport drinks are helpful  No changes in meds

## 2019-04-18 ENCOUNTER — TELEPHONE (OUTPATIENT)
Dept: FAMILY MEDICINE CLINIC | Facility: CLINIC | Age: 79
End: 2019-04-18

## 2019-04-18 NOTE — TELEPHONE ENCOUNTER
Spoke with Ofe Head and informed her that pt should still have refills left on script that was sent in Feb. She states that she will call [facility] and request the refill.

## 2019-07-01 RX ORDER — AMLODIPINE BESYLATE 5 MG/1
TABLET ORAL
Qty: 180 TABLET | Refills: 1 | Status: SHIPPED | OUTPATIENT
Start: 2019-07-01 | End: 2020-01-07

## 2019-07-01 NOTE — TELEPHONE ENCOUNTER
Future appt:    Last Appointment:  3/21/2019    Wife here for appt, requested med refill for .- to [facility], [address] [place] [address]- for 90 day supply      Cholesterol, Total (mg/dL)   Date Value   04/28/2018 154     HDL Cholesterol (mg/dL)   Date Value   04/28/2018

## 2019-08-13 RX ORDER — HYDROCHLOROTHIAZIDE 25 MG/1
TABLET ORAL
Qty: 90 TABLET | Refills: 1 | Status: SHIPPED | OUTPATIENT
Start: 2019-08-13 | End: 2020-02-10

## 2019-08-13 NOTE — TELEPHONE ENCOUNTER
Please advise refill of HCTZ 25mg.   Last Rx: 1/3/19    Future appt:    Last Appointment with provider:   3/21/2019 per office notes pt is to f/u in 6 months    Last appointment at [facility]:  3/21/2019    Cholesterol, Total (mg/dL)   Date Value   04/28/

## 2019-09-19 ENCOUNTER — OFFICE VISIT (OUTPATIENT)
Dept: FAMILY MEDICINE CLINIC | Facility: CLINIC | Age: 79
End: 2019-09-19
Payer: MEDICARE

## 2019-09-19 VITALS
TEMPERATURE: 98 F | HEART RATE: 62 BPM | RESPIRATION RATE: 16 BRPM | WEIGHT: 152.63 LBS | DIASTOLIC BLOOD PRESSURE: 70 MMHG | SYSTOLIC BLOOD PRESSURE: 138 MMHG | BODY MASS INDEX: 25.43 KG/M2 | HEIGHT: 65 IN

## 2019-09-19 DIAGNOSIS — I10 ESSENTIAL HYPERTENSION: Primary | ICD-10-CM

## 2019-09-19 DIAGNOSIS — H90.6 MIXED CONDUCTIVE AND SENSORINEURAL HEARING LOSS OF BOTH EARS: ICD-10-CM

## 2019-09-19 PROCEDURE — G0008 ADMIN INFLUENZA VIRUS VAC: HCPCS | Performed by: FAMILY MEDICINE

## 2019-09-19 PROCEDURE — 90662 IIV NO PRSV INCREASED AG IM: CPT | Performed by: FAMILY MEDICINE

## 2019-09-19 PROCEDURE — 99213 OFFICE O/P EST LOW 20 MIN: CPT | Performed by: FAMILY MEDICINE

## 2019-09-19 NOTE — PATIENT INSTRUCTIONS
Continue current meds  F/u 6 months for physical, Dr Justus Urias  Flu vaccine today  Resume walking

## 2019-09-19 NOTE — PROGRESS NOTES
[facility] [place]  PROGRESS NOTE  Chief Complaint:   Patient presents with:  Medication Follow-Up      HPI:   This is a [de-identified]year old male coming in for six-month follow-up of his chronic medical problem of hypertension.   Patient overall feels ve - 450.0 10(3)uL    MCV 93.8 80.0 - 100.0 fL    MCH 32.5 26.0 - 34.0 pg    MCHC 34.7 31.0 - 37.0 g/dL    RDW 12.6 11.0 - 15.0 %    RDW-SD 43.6 35.1 - 46.3 fL    Neutrophil Absolute Prelim 4.51 1.50 - 7.70 x10 (3) uL    Neutrophil Absolute 4.51 1.50 - 7.70 x yellow sclerae. Ears, Nose, Throat:  Denies hearing loss, sneezing, congestion, runny nose or sore throat. INTEGUMENTARY:  Denies rashes, itching, skin lesion, or excessive skin dryness.   CARDIOVASCULAR:  Denies chest pain, chest pressure, chest discomfor thyromegaly. SKIN: No rashes, no skin lesion, no bruising, good turgor. HEART:  Regular rate and rhythm, no murmurs, rubs or gallops. LUNGS: Clear to auscultation bilterally, no rales/rhonchi/wheezing. CHEST: No tenderness.      BACK: No tenderness, no you have any questions regarding this note.

## 2019-09-26 ENCOUNTER — TELEPHONE (OUTPATIENT)
Dept: FAMILY MEDICINE CLINIC | Facility: CLINIC | Age: 79
End: 2019-09-26

## 2019-09-26 NOTE — TELEPHONE ENCOUNTER
Future appt:     Your appointments     Date & Time Appointment Department [facility])    Mar 23, 2020  9:00 AM CDT Medicare Annual Well Visit with Melida Broderick MD [address] [place] [address], [facility]            Ritchie Lucas

## 2019-09-27 RX ORDER — ENALAPRIL MALEATE 10 MG/1
TABLET ORAL
Qty: 180 TABLET | Refills: 1 | Status: SHIPPED | OUTPATIENT
Start: 2019-09-27 | End: 2020-01-21

## 2020-01-07 RX ORDER — AMLODIPINE BESYLATE 5 MG/1
TABLET ORAL
Qty: 180 TABLET | Refills: 0 | Status: SHIPPED | OUTPATIENT
Start: 2020-01-07 | End: 2020-04-01

## 2020-01-07 NOTE — TELEPHONE ENCOUNTER
Future appt:     Your appointments     Date & Time Appointment Department [facility])    Mar 23, 2020  9:00 AM CDT Medicare Annual Well Visit with Solange Yeager MD [address] [place] [address]            José Miguel Call

## 2020-01-21 RX ORDER — ENALAPRIL MALEATE 10 MG/1
TABLET ORAL
Qty: 180 TABLET | Refills: 0 | Status: SHIPPED | OUTPATIENT
Start: 2020-01-21 | End: 2020-06-26

## 2020-01-21 NOTE — TELEPHONE ENCOUNTER
Future appt:     Your appointments     Date & Time Appointment Department [facility])    Mar 23, 2020  9:00 AM CDT Medicare Annual Well Visit with Jennifer Govea MD [address] [facility] [place] ([facility])            Rocio Reyes

## 2020-02-10 RX ORDER — HYDROCHLOROTHIAZIDE 25 MG/1
TABLET ORAL
Qty: 90 TABLET | Refills: 0 | Status: SHIPPED | OUTPATIENT
Start: 2020-02-10 | End: 2020-05-18

## 2020-02-10 NOTE — TELEPHONE ENCOUNTER
Future appt:     Your appointments     Date & Time Appointment Department [facility])    Mar 23, 2020  9:00 AM CDT Medicare Annual Well Visit with Jennifer Govea MD [address] [facility] [place] ([facility])            Rocio Reyes

## 2020-04-01 RX ORDER — AMLODIPINE BESYLATE 5 MG/1
TABLET ORAL
Qty: 180 TABLET | Refills: 1 | Status: SHIPPED | OUTPATIENT
Start: 2020-04-01 | End: 2020-10-03

## 2020-04-01 NOTE — TELEPHONE ENCOUNTER
Future appt:     Your appointments     Date & Time Appointment Department [facility])    May 08, 2020  3:15 PM CDT Medicare Annual Well Visit with Artur Alonzo MD [address] [place] [address]            Lavelle Sanon

## 2020-05-18 RX ORDER — HYDROCHLOROTHIAZIDE 25 MG/1
TABLET ORAL
Qty: 90 TABLET | Refills: 0 | Status: SHIPPED | OUTPATIENT
Start: 2020-05-18 | End: 2020-08-14

## 2020-05-18 NOTE — TELEPHONE ENCOUNTER
Future appt:     Your appointments     Date & Time Appointment Department [facility])    Aug 03, 2020 10:00 AM CDT Medicare Annual Well Visit with Regis Herrera MD [address] [facility] [place] ([facility])            [facility]

## 2020-06-25 NOTE — TELEPHONE ENCOUNTER
Future appt:     Your appointments     Date & Time Appointment Department [facility])    Aug 03, 2020 10:00 AM CDT Medicare Annual Well Visit with Benito Duvall MD [address] [place] [address]            Marty Campbell

## 2020-06-26 RX ORDER — ENALAPRIL MALEATE 10 MG/1
TABLET ORAL
Qty: 180 TABLET | Refills: 0 | Status: SHIPPED | OUTPATIENT
Start: 2020-06-26 | End: 2020-08-03

## 2020-08-03 ENCOUNTER — OFFICE VISIT (OUTPATIENT)
Dept: FAMILY MEDICINE CLINIC | Facility: CLINIC | Age: 80
End: 2020-08-03
Payer: MEDICARE

## 2020-08-03 ENCOUNTER — APPOINTMENT (OUTPATIENT)
Dept: LAB | Age: 80
End: 2020-08-03
Attending: FAMILY MEDICINE
Payer: MEDICARE

## 2020-08-03 VITALS
DIASTOLIC BLOOD PRESSURE: 74 MMHG | OXYGEN SATURATION: 98 % | TEMPERATURE: 98 F | WEIGHT: 150.81 LBS | SYSTOLIC BLOOD PRESSURE: 130 MMHG | HEIGHT: 65 IN | BODY MASS INDEX: 25.13 KG/M2 | RESPIRATION RATE: 16 BRPM | HEART RATE: 76 BPM

## 2020-08-03 DIAGNOSIS — E87.1 HYPONATREMIA: ICD-10-CM

## 2020-08-03 DIAGNOSIS — Z23 NEED FOR VACCINATION: ICD-10-CM

## 2020-08-03 DIAGNOSIS — I10 ESSENTIAL HYPERTENSION: ICD-10-CM

## 2020-08-03 DIAGNOSIS — Z00.00 ENCOUNTER FOR ANNUAL HEALTH EXAMINATION: Primary | ICD-10-CM

## 2020-08-03 DIAGNOSIS — H90.6 MIXED CONDUCTIVE AND SENSORINEURAL HEARING LOSS OF BOTH EARS: ICD-10-CM

## 2020-08-03 LAB
ALBUMIN SERPL-MCNC: 4.2 G/DL (ref 3.4–5)
ALBUMIN/GLOB SERPL: 1.3 {RATIO} (ref 1–2)
ALP LIVER SERPL-CCNC: 59 U/L (ref 45–117)
ALT SERPL-CCNC: 22 U/L (ref 16–61)
ANION GAP SERPL CALC-SCNC: 4 MMOL/L (ref 0–18)
AST SERPL-CCNC: 16 U/L (ref 15–37)
BASOPHILS # BLD AUTO: 0.04 X10(3) UL (ref 0–0.2)
BASOPHILS NFR BLD AUTO: 0.5 %
BILIRUB SERPL-MCNC: 1.1 MG/DL (ref 0.1–2)
BILIRUB UR QL STRIP.AUTO: NEGATIVE
BUN BLD-MCNC: 8 MG/DL (ref 7–18)
BUN/CREAT SERPL: 10.4 (ref 10–20)
CALCIUM BLD-MCNC: 9.3 MG/DL (ref 8.5–10.1)
CHLORIDE SERPL-SCNC: 99 MMOL/L (ref 98–112)
CO2 SERPL-SCNC: 29 MMOL/L (ref 21–32)
COLOR UR AUTO: YELLOW
CREAT BLD-MCNC: 0.77 MG/DL (ref 0.7–1.3)
DEPRECATED RDW RBC AUTO: 45 FL (ref 35.1–46.3)
EOSINOPHIL # BLD AUTO: 0.07 X10(3) UL (ref 0–0.7)
EOSINOPHIL NFR BLD AUTO: 1 %
ERYTHROCYTE [DISTWIDTH] IN BLOOD BY AUTOMATED COUNT: 12.9 % (ref 11–15)
GLOBULIN PLAS-MCNC: 3.3 G/DL (ref 2.8–4.4)
GLUCOSE BLD-MCNC: 94 MG/DL (ref 70–99)
GLUCOSE UR STRIP.AUTO-MCNC: NEGATIVE MG/DL
HCT VFR BLD AUTO: 49.3 % (ref 39–53)
HGB BLD-MCNC: 16.8 G/DL (ref 13–17.5)
IMM GRANULOCYTES # BLD AUTO: 0.03 X10(3) UL (ref 0–1)
IMM GRANULOCYTES NFR BLD: 0.4 %
KETONES UR STRIP.AUTO-MCNC: NEGATIVE MG/DL
LEUKOCYTE ESTERASE UR QL STRIP.AUTO: NEGATIVE
LYMPHOCYTES # BLD AUTO: 1.61 X10(3) UL (ref 1–4)
LYMPHOCYTES NFR BLD AUTO: 22.1 %
M PROTEIN MFR SERPL ELPH: 7.5 G/DL (ref 6.4–8.2)
MCH RBC QN AUTO: 32.2 PG (ref 26–34)
MCHC RBC AUTO-ENTMCNC: 34.1 G/DL (ref 31–37)
MCV RBC AUTO: 94.6 FL (ref 80–100)
MONOCYTES # BLD AUTO: 0.88 X10(3) UL (ref 0.1–1)
MONOCYTES NFR BLD AUTO: 12.1 %
NEUTROPHILS # BLD AUTO: 4.65 X10 (3) UL (ref 1.5–7.7)
NEUTROPHILS # BLD AUTO: 4.65 X10(3) UL (ref 1.5–7.7)
NEUTROPHILS NFR BLD AUTO: 63.9 %
NITRITE UR QL STRIP.AUTO: NEGATIVE
OSMOLALITY SERPL CALC.SUM OF ELEC: 272 MOSM/KG (ref 275–295)
PATIENT FASTING Y/N/NP: NO
PH UR STRIP.AUTO: 7 [PH] (ref 4.5–8)
PLATELET # BLD AUTO: 241 10(3)UL (ref 150–450)
POTASSIUM SERPL-SCNC: 3.8 MMOL/L (ref 3.5–5.1)
PROT UR STRIP.AUTO-MCNC: NEGATIVE MG/DL
RBC # BLD AUTO: 5.21 X10(6)UL (ref 3.8–5.8)
RBC UR QL AUTO: NEGATIVE
SODIUM SERPL-SCNC: 132 MMOL/L (ref 136–145)
SP GR UR STRIP.AUTO: 1.01 (ref 1–1.03)
UROBILINOGEN UR STRIP.AUTO-MCNC: <2 MG/DL
WBC # BLD AUTO: 7.3 X10(3) UL (ref 4–11)

## 2020-08-03 PROCEDURE — G0439 PPPS, SUBSEQ VISIT: HCPCS | Performed by: FAMILY MEDICINE

## 2020-08-03 PROCEDURE — 80053 COMPREHEN METABOLIC PANEL: CPT | Performed by: FAMILY MEDICINE

## 2020-08-03 PROCEDURE — 99213 OFFICE O/P EST LOW 20 MIN: CPT | Performed by: FAMILY MEDICINE

## 2020-08-03 PROCEDURE — 36415 COLL VENOUS BLD VENIPUNCTURE: CPT | Performed by: FAMILY MEDICINE

## 2020-08-03 PROCEDURE — 85025 COMPLETE CBC W/AUTO DIFF WBC: CPT | Performed by: FAMILY MEDICINE

## 2020-08-03 PROCEDURE — 90732 PPSV23 VACC 2 YRS+ SUBQ/IM: CPT | Performed by: FAMILY MEDICINE

## 2020-08-03 PROCEDURE — 81003 URINALYSIS AUTO W/O SCOPE: CPT

## 2020-08-03 PROCEDURE — G0009 ADMIN PNEUMOCOCCAL VACCINE: HCPCS | Performed by: FAMILY MEDICINE

## 2020-08-03 RX ORDER — ENALAPRIL MALEATE 20 MG/1
20 TABLET ORAL 2 TIMES DAILY
Qty: 90 TABLET | Refills: 3 | Status: SHIPPED | OUTPATIENT
Start: 2020-08-03 | End: 2020-08-12

## 2020-08-03 NOTE — PROGRESS NOTES
Chief Complaint:   Patient presents with: Well Adult      HPI:   This is a 79year old male coming in for healthcare check. Patient prior patient of Dr. Alma Glez here to get established.     Reviewed chronic illnesses including hypertension hearing loss an 1.50 - 7.70 x10(3) uL    Lymphocyte Absolute 1.41 1.00 - 4.00 x10(3) uL    Monocyte Absolute 0.87 0.10 - 1.00 x10(3) uL    Eosinophil Absolute 0.07 0.00 - 0.70 x10(3) uL    Basophil Absolute 0.05 0.00 - 0.20 x10(3) uL    Immature Granulocyte Absolute 0.06 Meds:  Current Outpatient Medications   Medication Sig Dispense Refill   • Enalapril Maleate 20 MG Oral Tab Take 1 tablet (20 mg total) by mouth 2 (two) times daily.  90 tablet 3   • HYDROCHLOROTHIAZIDE 25 MG Oral Tab TAKE 1 TABLET BY MOUTH DAILY 90 tablet External normal.     Nose: patent, no nasal discharge   Throat:  No tonsillar erythema or exudate. Mouth:  No oral lesions or ulcerations, good dentition. NECK: Supple,  no JVD, no thyromegaly.   SKIN: No rashes, no skin lesion, no bruising, good turg hypertension     Mixed conductive and sensorineural hearing loss of both ears     Foot pain, right      Betsy Leonard MD  8/3/2020  10:00 AM

## 2020-08-03 NOTE — PATIENT INSTRUCTIONS
Good exam       Obtain labs today       Obtain Pneumonia - Pneumovax  Today    Ok for refills.      Recheck in 6 months

## 2020-08-05 ENCOUNTER — TELEPHONE (OUTPATIENT)
Dept: FAMILY MEDICINE CLINIC | Facility: CLINIC | Age: 80
End: 2020-08-05

## 2020-08-05 NOTE — TELEPHONE ENCOUNTER
----- Message from Ulises Gonsalez MD sent at 8/4/2020  5:27 PM CDT -----  Labs reviewed     CBC normal    Chem profile - good.   Kidney function , LFTs - normal

## 2020-08-11 ENCOUNTER — TELEPHONE (OUTPATIENT)
Dept: FAMILY MEDICINE CLINIC | Facility: CLINIC | Age: 80
End: 2020-08-11

## 2020-08-11 DIAGNOSIS — I10 ESSENTIAL HYPERTENSION: Primary | ICD-10-CM

## 2020-08-12 RX ORDER — ENALAPRIL MALEATE 20 MG/1
20 TABLET ORAL DAILY
Qty: 90 TABLET | Refills: 0 | Status: SHIPPED | OUTPATIENT
Start: 2020-08-12 | End: 2020-12-23

## 2020-08-12 NOTE — TELEPHONE ENCOUNTER
Please notify the patient's wife I corrected the prescription to indicate 20mg once a day, new script sent to the pharmacy.   Patient to take what he has now and only take 20mg a day, just use up the pills her has if he already picked up the 20mg twice a da

## 2020-08-12 NOTE — TELEPHONE ENCOUNTER
Patient's wife Paras Rutledge informed of the below. Paras Rutledge became upset stating that she didn't need a new prescription sent. States she already has the script for 20 mg with BID instructions.      Paras Maty states she was calling to clarify with Dr. Murrel Apgar what she rem

## 2020-08-12 NOTE — TELEPHONE ENCOUNTER
Spoke with patient's wife Gaby Kaylie. States patient was originally taking Enalapril 10 MG bid. States when patient saw Dr. Yanique Steiner, he was going to change the medication to 20 mg once daily so that patient did not have to take the medication bid.   Gaby Lott states

## 2020-08-14 RX ORDER — HYDROCHLOROTHIAZIDE 25 MG/1
TABLET ORAL
Qty: 90 TABLET | Refills: 3 | Status: SHIPPED | OUTPATIENT
Start: 2020-08-14 | End: 2021-02-16

## 2020-08-14 NOTE — TELEPHONE ENCOUNTER
Future appt:     Last Appointment with provider:   8/3/2020; Return in 6 months (on 2/3/2021). Last appointment at [facility]:  8/3/2020  Cholesterol, Total (mg/dL)   Date Value   04/28/2018 154     HDL Cholesterol (mg/dL)   Date Value   04/28/2018 58     LDL Cholesterol (mg/dL)   Date Value   04/28/2018 88     Triglycerides (mg/dL)   Date Value   04/28/2018 41     No results found for: EAG, A1C  No results found for: T4F, TSH, TSHT4    Last RF:  5/18/2020    No follow-ups on file.

## 2020-10-02 NOTE — TELEPHONE ENCOUNTER
Future appt:    Last Appointment with provider:   8/3/2020 well adult; recheck 6 months  Last appointment at [facility]:  8/3/2020  Cholesterol, Total (mg/dL)   Date Value   04/28/2018 154     HDL Cholesterol (mg/dL)   Date Value   04/28/2018 58     LDL

## 2020-10-03 RX ORDER — AMLODIPINE BESYLATE 5 MG/1
TABLET ORAL
Qty: 180 TABLET | Refills: 1 | Status: SHIPPED | OUTPATIENT
Start: 2020-10-03 | End: 2021-04-12

## 2020-12-21 DIAGNOSIS — I10 ESSENTIAL HYPERTENSION: ICD-10-CM

## 2020-12-21 NOTE — TELEPHONE ENCOUNTER
Future appt:    Last Appointment with provider:   8/3/2020(Tracee)-F/U in 6 months  Last appointment at [facility]:  8/3/2020    Enalapril Maleate 20 MG Oral Tab    90tabs  0refills      Filled:8/12/20 Summary: Take 1 tablet (20 mg total) by mouth daily

## 2020-12-23 DIAGNOSIS — I10 ESSENTIAL HYPERTENSION: ICD-10-CM

## 2020-12-23 RX ORDER — ENALAPRIL MALEATE 20 MG/1
TABLET ORAL
Qty: 180 TABLET | Refills: 3 | Status: SHIPPED | OUTPATIENT
Start: 2020-12-23

## 2020-12-23 RX ORDER — ENALAPRIL MALEATE 20 MG/1
TABLET ORAL
Qty: 180 TABLET | Refills: 3 | OUTPATIENT
Start: 2020-12-23

## 2020-12-23 NOTE — TELEPHONE ENCOUNTER
Received e-scribe refill for enalapril from [facility]. This was sent earlier this morning. This refill request denied with the above note back to the pharmacy asking they look in patient's chart.        Future appt:    Last Appointment with provider:   8/3/2

## 2021-01-05 ENCOUNTER — IMMUNIZATION (OUTPATIENT)
Dept: FAMILY MEDICINE CLINIC | Facility: CLINIC | Age: 81
End: 2021-01-05
Payer: MEDICARE

## 2021-01-05 DIAGNOSIS — Z23 NEED FOR VACCINATION: ICD-10-CM

## 2021-01-05 PROCEDURE — 90662 IIV NO PRSV INCREASED AG IM: CPT | Performed by: FAMILY MEDICINE

## 2021-01-05 PROCEDURE — G0008 ADMIN INFLUENZA VIRUS VAC: HCPCS | Performed by: FAMILY MEDICINE

## 2021-02-16 ENCOUNTER — OFFICE VISIT (OUTPATIENT)
Dept: FAMILY MEDICINE CLINIC | Facility: CLINIC | Age: 81
End: 2021-02-16
Payer: MEDICARE

## 2021-02-16 VITALS
SYSTOLIC BLOOD PRESSURE: 130 MMHG | HEIGHT: 65 IN | RESPIRATION RATE: 18 BRPM | WEIGHT: 155 LBS | HEART RATE: 75 BPM | OXYGEN SATURATION: 98 % | DIASTOLIC BLOOD PRESSURE: 86 MMHG | TEMPERATURE: 97 F | BODY MASS INDEX: 25.83 KG/M2

## 2021-02-16 DIAGNOSIS — H90.6 MIXED CONDUCTIVE AND SENSORINEURAL HEARING LOSS OF BOTH EARS: ICD-10-CM

## 2021-02-16 DIAGNOSIS — I10 ESSENTIAL HYPERTENSION: Primary | ICD-10-CM

## 2021-02-16 DIAGNOSIS — B37.42 CANDIDAL BALANITIS: ICD-10-CM

## 2021-02-16 PROCEDURE — 99213 OFFICE O/P EST LOW 20 MIN: CPT | Performed by: FAMILY MEDICINE

## 2021-02-16 RX ORDER — NYSTATIN 100000 U/G
1 CREAM TOPICAL 2 TIMES DAILY
Qty: 15 G | Refills: 1 | Status: SHIPPED | OUTPATIENT
Start: 2021-02-16 | End: 2021-08-16

## 2021-02-16 RX ORDER — HYDROCHLOROTHIAZIDE 25 MG/1
25 TABLET ORAL DAILY
Qty: 90 TABLET | Refills: 0 | Status: SHIPPED | OUTPATIENT
Start: 2021-02-16 | End: 2021-05-26

## 2021-02-16 NOTE — PROGRESS NOTES
[facility] [place]  PROGRESS NOTE  Chief Complaint:   Patient presents with:  Medication Follow-Up: needs refill      HPI:   This is a 79year old male with hypertension and hearing loss with hearing aids presents for medication refill and also female cancer   • Other (deafness) Mother    • Heart Disorder Sister    • Diabetes Sister    • Heart Disorder Brother      Allergies:  No Known Allergies  Current Meds:  Current Outpatient Medications   Medication Sig Dispense Refill   • hydrochlorothiazid as of this encounter: 155 lb (70.3 kg). Vital signs reviewed. Physical Exam:  GEN:  Patient is alert, awake and oriented, well developed, well nourished, no acute distress. EYES:  Sclera anicteric, conjunctiva normal, PERRLA, EOMI.   NECK: Supple, no c 03/06/1990    Patient/Caregiver Education: Patient/Caregiver Education: There are no barriers to learning. Medical education done. Outcome: Patient verbalizes understanding.  Patient is notified to call with any questions, complications, allergies, or wor

## 2021-02-16 NOTE — PATIENT INSTRUCTIONS
Blood pressure stable today. Advice low salt diet and exercise. Monitor your blood pressure. Return to clinic if systolic blood pressure more than 946 or diastolic more than 118. Apply nystatin cream twice a day to affected area.    Return to clinic if

## 2021-02-19 ENCOUNTER — TELEPHONE (OUTPATIENT)
Dept: FAMILY MEDICINE CLINIC | Facility: CLINIC | Age: 81
End: 2021-02-19

## 2021-02-19 DIAGNOSIS — E03.9 ACQUIRED HYPOTHYROIDISM: ICD-10-CM

## 2021-02-19 DIAGNOSIS — E87.1 HYPONATREMIA: ICD-10-CM

## 2021-02-19 DIAGNOSIS — I10 ESSENTIAL HYPERTENSION: Primary | ICD-10-CM

## 2021-02-19 NOTE — TELEPHONE ENCOUNTER
I did not recommend any blood test for patient. Patient can contact Dr. Driss Westbrook when he is back regarding labs.

## 2021-02-19 NOTE — TELEPHONE ENCOUNTER
Last communication from Dr. Ball Books office was August 2020. Patient saw Dr. Nikkie Ellis for his 6 month f/u appt on 2/16/21. Reviewed OV notes and do not see recommendation to obtain labs. Please advise.

## 2021-02-19 NOTE — TELEPHONE ENCOUNTER
Informed patient's wife Dorothy Padilla of the below.  She states that when Ray's PCP was Dr. Lela Sinclair he used to always have labs done every 6 months to check on his liver function, kidneys, etc. She stated that he takes three different medications and it was usual

## 2021-02-22 NOTE — TELEPHONE ENCOUNTER
Appt scheduled for non fasting labs.     Your appointments     Date & Time Appointment Department [facility])    Feb 26, 2021  9:45 AM CST Laboratory Visit with [facility] ([facility] Arlene Higgins)            THE [facility]  [facility] Ref L

## 2021-02-26 ENCOUNTER — LABORATORY ENCOUNTER (OUTPATIENT)
Dept: LAB | Age: 81
End: 2021-02-26
Attending: FAMILY MEDICINE
Payer: MEDICARE

## 2021-02-26 DIAGNOSIS — I10 ESSENTIAL HYPERTENSION: ICD-10-CM

## 2021-02-26 DIAGNOSIS — E03.9 ACQUIRED HYPOTHYROIDISM: ICD-10-CM

## 2021-02-26 DIAGNOSIS — E87.1 HYPONATREMIA: ICD-10-CM

## 2021-02-26 LAB
ALBUMIN SERPL-MCNC: 3.9 G/DL (ref 3.4–5)
ALBUMIN/GLOB SERPL: 1.2 {RATIO} (ref 1–2)
ALP LIVER SERPL-CCNC: 61 U/L
ALT SERPL-CCNC: 23 U/L
ANION GAP SERPL CALC-SCNC: 6 MMOL/L (ref 0–18)
AST SERPL-CCNC: 17 U/L (ref 15–37)
BASOPHILS # BLD AUTO: 0.06 X10(3) UL (ref 0–0.2)
BASOPHILS NFR BLD AUTO: 0.8 %
BILIRUB SERPL-MCNC: 1 MG/DL (ref 0.1–2)
BUN BLD-MCNC: 8 MG/DL (ref 7–18)
BUN/CREAT SERPL: 11.6 (ref 10–20)
CALCIUM BLD-MCNC: 8.8 MG/DL (ref 8.5–10.1)
CHLORIDE SERPL-SCNC: 96 MMOL/L (ref 98–112)
CO2 SERPL-SCNC: 29 MMOL/L (ref 21–32)
CREAT BLD-MCNC: 0.69 MG/DL
DEPRECATED RDW RBC AUTO: 44.8 FL (ref 35.1–46.3)
EOSINOPHIL # BLD AUTO: 0.11 X10(3) UL (ref 0–0.7)
EOSINOPHIL NFR BLD AUTO: 1.4 %
ERYTHROCYTE [DISTWIDTH] IN BLOOD BY AUTOMATED COUNT: 12.6 % (ref 11–15)
GLOBULIN PLAS-MCNC: 3.3 G/DL (ref 2.8–4.4)
GLUCOSE BLD-MCNC: 86 MG/DL (ref 70–99)
HCT VFR BLD AUTO: 47.7 %
HGB BLD-MCNC: 16.5 G/DL
IMM GRANULOCYTES # BLD AUTO: 0.06 X10(3) UL (ref 0–1)
IMM GRANULOCYTES NFR BLD: 0.8 %
LYMPHOCYTES # BLD AUTO: 1.76 X10(3) UL (ref 1–4)
LYMPHOCYTES NFR BLD AUTO: 22.1 %
M PROTEIN MFR SERPL ELPH: 7.2 G/DL (ref 6.4–8.2)
MCH RBC QN AUTO: 33.1 PG (ref 26–34)
MCHC RBC AUTO-ENTMCNC: 34.6 G/DL (ref 31–37)
MCV RBC AUTO: 95.6 FL
MONOCYTES # BLD AUTO: 1.12 X10(3) UL (ref 0.1–1)
MONOCYTES NFR BLD AUTO: 14.1 %
NEUTROPHILS # BLD AUTO: 4.84 X10 (3) UL (ref 1.5–7.7)
NEUTROPHILS # BLD AUTO: 4.84 X10(3) UL (ref 1.5–7.7)
NEUTROPHILS NFR BLD AUTO: 60.8 %
OSMOLALITY SERPL CALC.SUM OF ELEC: 270 MOSM/KG (ref 275–295)
PATIENT FASTING Y/N/NP: YES
PLATELET # BLD AUTO: 245 10(3)UL (ref 150–450)
POTASSIUM SERPL-SCNC: 4 MMOL/L (ref 3.5–5.1)
RBC # BLD AUTO: 4.99 X10(6)UL
SODIUM SERPL-SCNC: 131 MMOL/L (ref 136–145)
TSI SER-ACNC: 3.06 MIU/ML (ref 0.36–3.74)
WBC # BLD AUTO: 8 X10(3) UL (ref 4–11)

## 2021-02-26 PROCEDURE — 84443 ASSAY THYROID STIM HORMONE: CPT

## 2021-02-26 PROCEDURE — 80053 COMPREHEN METABOLIC PANEL: CPT

## 2021-02-26 PROCEDURE — 36415 COLL VENOUS BLD VENIPUNCTURE: CPT

## 2021-02-26 PROCEDURE — 85025 COMPLETE CBC W/AUTO DIFF WBC: CPT

## 2021-03-05 ENCOUNTER — LAB ENCOUNTER (OUTPATIENT)
Dept: LAB | Age: 81
End: 2021-03-05
Attending: FAMILY MEDICINE
Payer: MEDICARE

## 2021-03-05 DIAGNOSIS — I10 ESSENTIAL HYPERTENSION: ICD-10-CM

## 2021-03-05 LAB
BILIRUB UR QL STRIP.AUTO: NEGATIVE
CLARITY UR REFRACT.AUTO: CLEAR
COLOR UR AUTO: YELLOW
GLUCOSE UR STRIP.AUTO-MCNC: NEGATIVE MG/DL
KETONES UR STRIP.AUTO-MCNC: NEGATIVE MG/DL
LEUKOCYTE ESTERASE UR QL STRIP.AUTO: NEGATIVE
NITRITE UR QL STRIP.AUTO: NEGATIVE
PH UR STRIP.AUTO: 7 [PH] (ref 5–8)
PROT UR STRIP.AUTO-MCNC: NEGATIVE MG/DL
RBC UR QL AUTO: NEGATIVE
SP GR UR STRIP.AUTO: 1.01 (ref 1–1.03)
UROBILINOGEN UR STRIP.AUTO-MCNC: 2 MG/DL

## 2021-03-05 PROCEDURE — 81003 URINALYSIS AUTO W/O SCOPE: CPT

## 2021-03-12 DIAGNOSIS — Z23 NEED FOR VACCINATION: ICD-10-CM

## 2021-04-12 ENCOUNTER — TELEPHONE (OUTPATIENT)
Dept: FAMILY MEDICINE CLINIC | Facility: CLINIC | Age: 81
End: 2021-04-12

## 2021-04-12 NOTE — TELEPHONE ENCOUNTER
Pts wife calling and states [facility] contacted us for a refill on Amlodipine to be sent to them. Nothing in chart - please advise on refill.

## 2021-04-12 NOTE — TELEPHONE ENCOUNTER
Please advise.        Future appt:    Last Appointment with provider:  8/3/20 well adult; recheck in 6 months  Last appointment [facility]:  2/16/2021 BP f/u and problem visit with Dr. Stephany Toor; return if symptoms do not improve  Cholesterol, Total (mg/d

## 2021-04-14 RX ORDER — AMLODIPINE BESYLATE 5 MG/1
5 TABLET ORAL 2 TIMES DAILY
Qty: 180 TABLET | Refills: 1 | Status: SHIPPED | OUTPATIENT
Start: 2021-04-14 | End: 2021-08-23

## 2021-05-18 ENCOUNTER — OFFICE VISIT (OUTPATIENT)
Dept: FAMILY MEDICINE CLINIC | Facility: CLINIC | Age: 81
End: 2021-05-18
Payer: MEDICARE

## 2021-05-18 VITALS
DIASTOLIC BLOOD PRESSURE: 80 MMHG | TEMPERATURE: 98 F | OXYGEN SATURATION: 99 % | BODY MASS INDEX: 25.66 KG/M2 | HEIGHT: 65 IN | RESPIRATION RATE: 16 BRPM | SYSTOLIC BLOOD PRESSURE: 132 MMHG | HEART RATE: 68 BPM | WEIGHT: 154 LBS

## 2021-05-18 DIAGNOSIS — B37.42 CANDIDAL BALANITIS: Primary | ICD-10-CM

## 2021-05-18 DIAGNOSIS — H90.6 MIXED CONDUCTIVE AND SENSORINEURAL HEARING LOSS OF BOTH EARS: ICD-10-CM

## 2021-05-18 PROCEDURE — 99213 OFFICE O/P EST LOW 20 MIN: CPT | Performed by: FAMILY MEDICINE

## 2021-05-18 RX ORDER — FLUCONAZOLE 150 MG/1
150 TABLET ORAL DAILY
Qty: 10 TABLET | Refills: 0 | Status: SHIPPED | OUTPATIENT
Start: 2021-05-18 | End: 2021-08-16

## 2021-05-18 NOTE — PROGRESS NOTES
[facility] [place]  PROGRESS NOTE  Chief Complaint:   Patient presents with:  Testicular Swelling: testicular/penis      HPI:   This is a 79year old male coming in for redness and pain in his penis.   He said that this has been present for many Medication Sig Dispense Refill   • fluconazole (DIFLUCAN) 150 MG Oral Tab Take 1 tablet (150 mg total) by mouth daily. 10 tablet 0   • amLODIPine Besylate 5 MG Oral Tab Take 1 tablet (5 mg total) by mouth 2 (two) times daily.  180 tablet 1   • hydrochloro oriented, well developed, well nourished, no apparent distress. ABDOMEN:  Soft, nondistended, nontender, bowel sounds normal in all 4 quadrants, no masses, no hepatosplenomegaly. Genitalia: Uncircumcised male. He was able to peel the foreskin back.   The

## 2021-05-18 NOTE — PATIENT INSTRUCTIONS
Take Diflucan daily for 10 days to treat the problem.   If not better in 10 days, the next step is to see the urologist.

## 2021-05-21 ENCOUNTER — TELEPHONE (OUTPATIENT)
Dept: FAMILY MEDICINE CLINIC | Facility: CLINIC | Age: 81
End: 2021-05-21

## 2021-05-21 DIAGNOSIS — I10 ESSENTIAL HYPERTENSION: ICD-10-CM

## 2021-05-21 NOTE — TELEPHONE ENCOUNTER
RF HCTZ  #90 w/ rfs        to    [facility] in pharmacy         please confirm         Future Appointments   Date Time Provider Brock Osorio   8/16/2021  9:15 AM Jaden Thomson MD [facility] [place] [facility]

## 2021-05-21 NOTE — TELEPHONE ENCOUNTER
Future appt:     Your appointments     Date & Time Appointment Department [facility])    Aug 16, 2021  9:15 AM CDT Medicare Annual Well Visit with Soila Sanon MD [address] [place] [address] ([facility]            Major Le[place]

## 2021-05-26 RX ORDER — HYDROCHLOROTHIAZIDE 25 MG/1
25 TABLET ORAL DAILY
Qty: 90 TABLET | Refills: 1 | Status: SHIPPED | OUTPATIENT
Start: 2021-05-26 | End: 2021-09-28

## 2021-05-26 NOTE — TELEPHONE ENCOUNTER
Pts wife calling and states pt only has a week left of meds and mail order takes time to send. They are asking another provider advise on rx at this point.

## 2021-05-26 NOTE — TELEPHONE ENCOUNTER
Refill requests have been sent to PCP's desktop as covering providers review it. Unsure what happened. Routed directly to provider covering TR desktop today. Please advise.

## 2021-08-16 ENCOUNTER — OFFICE VISIT (OUTPATIENT)
Dept: FAMILY MEDICINE CLINIC | Facility: CLINIC | Age: 81
End: 2021-08-16
Payer: MEDICARE

## 2021-08-16 VITALS
HEIGHT: 65 IN | SYSTOLIC BLOOD PRESSURE: 140 MMHG | RESPIRATION RATE: 16 BRPM | HEART RATE: 79 BPM | BODY MASS INDEX: 25.33 KG/M2 | WEIGHT: 152 LBS | OXYGEN SATURATION: 96 % | DIASTOLIC BLOOD PRESSURE: 78 MMHG | TEMPERATURE: 98 F

## 2021-08-16 DIAGNOSIS — H90.6 MIXED CONDUCTIVE AND SENSORINEURAL HEARING LOSS OF BOTH EARS: ICD-10-CM

## 2021-08-16 DIAGNOSIS — I10 ESSENTIAL HYPERTENSION: ICD-10-CM

## 2021-08-16 DIAGNOSIS — Z00.00 ENCOUNTER FOR ANNUAL HEALTH EXAMINATION: Primary | ICD-10-CM

## 2021-08-16 DIAGNOSIS — B37.2 YEAST DERMATITIS: ICD-10-CM

## 2021-08-16 PROCEDURE — G0439 PPPS, SUBSEQ VISIT: HCPCS | Performed by: FAMILY MEDICINE

## 2021-08-16 PROCEDURE — 99213 OFFICE O/P EST LOW 20 MIN: CPT | Performed by: FAMILY MEDICINE

## 2021-08-16 RX ORDER — KETOCONAZOLE 20 MG/G
1 CREAM TOPICAL 2 TIMES DAILY
Qty: 60 G | Refills: 1 | Status: SHIPPED | OUTPATIENT
Start: 2021-08-16

## 2021-08-16 NOTE — PATIENT INSTRUCTIONS
New medication for rash  - apply twice daily for 3 weeks.        Ok for refills       Recheck in 6 months

## 2021-08-23 RX ORDER — AMLODIPINE BESYLATE 5 MG/1
TABLET ORAL
Qty: 180 TABLET | Refills: 3 | Status: SHIPPED | OUTPATIENT
Start: 2021-08-23

## 2021-08-23 NOTE — TELEPHONE ENCOUNTER
Future appt:    Last Appointment with provider:   8/16/2021  Last appointment at [facility]:  8/16/2021  Last px: 8/16/2021    amLODIPine Besylate 5 MG Oral Tab #180 with 1 refill, pt to take 1 tab po bid, last refilled on 4/14/2021    Cholesterol, Total

## 2021-09-28 DIAGNOSIS — I10 ESSENTIAL HYPERTENSION: ICD-10-CM

## 2021-09-28 RX ORDER — HYDROCHLOROTHIAZIDE 25 MG/1
TABLET ORAL
Qty: 90 TABLET | Refills: 1 | Status: SHIPPED | OUTPATIENT
Start: 2021-09-28 | End: 2021-12-07

## 2021-09-28 NOTE — TELEPHONE ENCOUNTER
Hydrochlorothiazide: 5/26/21    Recheck in 6 months     Future appt:    Last Appointment with provider:   8/16/2021  Last appointment at [facility]:  8/16/2021  Cholesterol, Total (mg/dL)   Date Value   04/28/2018 154     HDL Cholesterol (mg/dL)   Date V

## 2021-12-07 ENCOUNTER — TELEPHONE (OUTPATIENT)
Dept: FAMILY MEDICINE CLINIC | Facility: CLINIC | Age: 81
End: 2021-12-07

## 2021-12-07 DIAGNOSIS — I10 ESSENTIAL HYPERTENSION: ICD-10-CM

## 2021-12-07 RX ORDER — HYDROCHLOROTHIAZIDE 25 MG/1
25 TABLET ORAL DAILY
Qty: 90 TABLET | Refills: 1 | Status: SHIPPED | OUTPATIENT
Start: 2021-12-07

## 2021-12-07 NOTE — TELEPHONE ENCOUNTER
Future appt:    Last Appointment with provider:   8/16/2021 for annual physical.  [facility] appointment [facility]:  8/16/2021  Cholesterol, Total (mg/dL)   Date Value   04/28/2018 154     HDL Cholesterol (mg/dL)   Date Value   04/28/2018 58     LDL Choles

## 2021-12-15 ENCOUNTER — TELEPHONE (OUTPATIENT)
Dept: FAMILY MEDICINE CLINIC | Facility: CLINIC | Age: 81
End: 2021-12-15

## 2021-12-15 ENCOUNTER — OFFICE VISIT (OUTPATIENT)
Dept: FAMILY MEDICINE CLINIC | Facility: CLINIC | Age: 81
End: 2021-12-15
Payer: MEDICARE

## 2021-12-15 VITALS
TEMPERATURE: 98 F | DIASTOLIC BLOOD PRESSURE: 82 MMHG | HEIGHT: 65 IN | OXYGEN SATURATION: 98 % | RESPIRATION RATE: 18 BRPM | HEART RATE: 70 BPM | SYSTOLIC BLOOD PRESSURE: 138 MMHG | WEIGHT: 156 LBS | BODY MASS INDEX: 25.99 KG/M2

## 2021-12-15 DIAGNOSIS — N48.1 BALANITIS: Primary | ICD-10-CM

## 2021-12-15 PROCEDURE — 99213 OFFICE O/P EST LOW 20 MIN: CPT | Performed by: FAMILY MEDICINE

## 2021-12-15 RX ORDER — NYSTATIN 100000 U/G
1 CREAM TOPICAL 2 TIMES DAILY
Qty: 30 G | Refills: 0 | Status: SHIPPED | OUTPATIENT
Start: 2021-12-15

## 2021-12-15 NOTE — PATIENT INSTRUCTIONS
Start using new cream.   See Dr Johnell Meigs urologist if no improvement. Return to clinic if any concern.

## 2021-12-15 NOTE — TELEPHONE ENCOUNTER
appt opened up with dr Alex Miranda after message was sent, pt agreed to come in at that time- call back no longer needed

## 2021-12-15 NOTE — PROGRESS NOTES
[address]  PROGRESS NOTE  Chief Complaint:   Patient presents with:   Other: irriration- penis      HPI:   This is a 79year old male presents to clinic complaining of redness and discomfort at the tip of his penis that has been going on times daily. 30 g 0   • triamcinolone 0.1 % External Cream Apply topically 2 (two) times daily as needed. 30 g 0   • hydroCHLOROthiazide 25 MG Oral Tab Take 1 tablet (25 mg total) by mouth daily.  90 tablet 1   • AMLODIPINE 5 MG Oral Tab TAKE 1 TABLET BY MO there is erythema and dermatitis of foreskin. SKIN: No rashes, no skin lesion, no bruising, good turgor. PSYCHIATRIC: Alert and oriented x 3; affect appropriate, no depressed mood or anxiety      ASSESSMENT AND PLAN:   Shannan Conley was seen today for other.

## 2021-12-15 NOTE — TELEPHONE ENCOUNTER
pt has penis infection - really needs to be seen but nothing avail today, can he be squeezed in somewhere today- spouse states he really needs it looked at soon

## 2022-03-16 ENCOUNTER — OFFICE VISIT (OUTPATIENT)
Dept: FAMILY MEDICINE CLINIC | Facility: CLINIC | Age: 82
End: 2022-03-16
Payer: MEDICARE

## 2022-03-16 VITALS
WEIGHT: 154.19 LBS | OXYGEN SATURATION: 98 % | DIASTOLIC BLOOD PRESSURE: 76 MMHG | TEMPERATURE: 98 F | BODY MASS INDEX: 26 KG/M2 | HEART RATE: 98 BPM | RESPIRATION RATE: 16 BRPM | SYSTOLIC BLOOD PRESSURE: 128 MMHG

## 2022-03-16 DIAGNOSIS — I10 ESSENTIAL HYPERTENSION: Primary | ICD-10-CM

## 2022-03-16 PROCEDURE — 99214 OFFICE O/P EST MOD 30 MIN: CPT | Performed by: FAMILY MEDICINE

## 2022-03-16 RX ORDER — TAMSULOSIN HYDROCHLORIDE 0.4 MG/1
0.4 CAPSULE ORAL DAILY
COMMUNITY
Start: 2022-01-13

## 2022-03-16 RX ORDER — ENALAPRIL MALEATE 20 MG/1
20 TABLET ORAL DAILY
Qty: 90 TABLET | Refills: 3 | Status: SHIPPED | OUTPATIENT
Start: 2022-03-16

## 2022-04-12 RX ORDER — HYDROCHLOROTHIAZIDE 25 MG/1
TABLET ORAL
Qty: 90 TABLET | Refills: 3 | Status: SHIPPED | OUTPATIENT
Start: 2022-04-12

## 2022-04-12 NOTE — TELEPHONE ENCOUNTER
Future appt: Your appointments     Date & Time Appointment Department [facility])    Aug 17, 2022  9:00 AM CDT Medicare Annual Well Visit with Farhan Yanez MD [address] [facility] [place] ([facility])            [address] [facility] [place]  [facility] [id number]  [phone]        Last Appointment with provider:   3/16/2022 with TR with plan of recheck in August for physical.   Last appointment at [facility]:  3/16/2022  Cholesterol, Total (mg/dL)   Date Value   04/28/2018 154     HDL Cholesterol (mg/dL)   Date Value   04/28/2018 58     LDL Cholesterol (mg/dL)   Date Value   04/28/2018 88     Triglycerides (mg/dL)   Date Value   04/28/2018 41     No results found for: EAG, A1C  Lab Results   Component Value Date    TSH 3.060 02/26/2021       No follow-ups on file.

## 2022-04-28 ENCOUNTER — TELEPHONE (OUTPATIENT)
Dept: FAMILY MEDICINE CLINIC | Facility: CLINIC | Age: 82
End: 2022-04-28

## 2022-04-28 NOTE — TELEPHONE ENCOUNTER
Patient has very high BP at Dr. Terell Boo' office right now. Has dizziness when he stands. Advised patient go to ER for evaluation. Dr. Terell Boo' office will direct patient to go to ER now.

## 2022-04-28 NOTE — TELEPHONE ENCOUNTER
pt is at dr young's office now, bp is 180/100- pt feeling dizzy - office wants to know what dr espinal

## 2022-05-06 ENCOUNTER — TELEPHONE (OUTPATIENT)
Dept: FAMILY MEDICINE CLINIC | Facility: CLINIC | Age: 82
End: 2022-05-06

## 2022-05-06 NOTE — TELEPHONE ENCOUNTER
Appointment rescheduled.      Future Appointments   Date Time Provider [facility]   5/9/2022  4:00 PM Giovana Ferrer MD [facility] [place] [facility]   8/17/2022  9:00 AM Giovana Ferrer MD [facility] [place] [facility] [place]

## 2022-05-09 ENCOUNTER — OFFICE VISIT (OUTPATIENT)
Dept: FAMILY MEDICINE CLINIC | Facility: CLINIC | Age: 82
End: 2022-05-09
Payer: MEDICARE

## 2022-05-09 VITALS
TEMPERATURE: 98 F | RESPIRATION RATE: 16 BRPM | BODY MASS INDEX: 25.83 KG/M2 | HEIGHT: 65 IN | HEART RATE: 84 BPM | WEIGHT: 155 LBS | DIASTOLIC BLOOD PRESSURE: 86 MMHG | OXYGEN SATURATION: 96 % | SYSTOLIC BLOOD PRESSURE: 152 MMHG

## 2022-05-09 DIAGNOSIS — I10 ESSENTIAL HYPERTENSION: Primary | ICD-10-CM

## 2022-05-09 PROBLEM — N40.1 ENLARGED PROSTATE WITH LOWER URINARY TRACT SYMPTOMS (LUTS): Status: ACTIVE | Noted: 2022-01-13

## 2022-05-09 PROCEDURE — 99214 OFFICE O/P EST MOD 30 MIN: CPT | Performed by: FAMILY MEDICINE

## 2022-05-09 NOTE — PATIENT INSTRUCTIONS
Continue with current medications.        Increase activity - turn off the TV    Recheck in 3 months

## 2022-06-24 NOTE — TELEPHONE ENCOUNTER
Future appt: Your appointments     Date & Time Appointment Department [facility])    Aug 17, 2022  9:00 AM CDT Medicare Annual Well Visit with Ashley Serrano MD [address] [facility] ([facility])            [address] [place] [address]  [phone]        Last Appointment with provider:   5/9/2022 with TR for ER follow up. Plan for recheck in 3 months. Last appointment at [facility]:  5/9/2022  Cholesterol, Total (mg/dL)   Date Value   04/28/2018 154     HDL Cholesterol (mg/dL)   Date Value   04/28/2018 58     LDL Cholesterol (mg/dL)   Date Value   04/28/2018 88     Triglycerides (mg/dL)   Date Value   04/28/2018 41     No results found for: EAG, A1C  Lab Results   Component Value Date    TSH 3.060 02/26/2021       No follow-ups on file.

## 2022-06-25 RX ORDER — AMLODIPINE BESYLATE 5 MG/1
TABLET ORAL
Qty: 180 TABLET | Refills: 0 | Status: SHIPPED | OUTPATIENT
Start: 2022-06-25

## 2022-08-17 ENCOUNTER — OFFICE VISIT (OUTPATIENT)
Dept: FAMILY MEDICINE CLINIC | Facility: CLINIC | Age: 82
End: 2022-08-17
Payer: MEDICARE

## 2022-08-17 ENCOUNTER — LAB ENCOUNTER (OUTPATIENT)
Dept: LAB | Age: 82
End: 2022-08-17
Attending: FAMILY MEDICINE
Payer: MEDICARE

## 2022-08-17 VITALS
RESPIRATION RATE: 18 BRPM | TEMPERATURE: 97 F | HEART RATE: 65 BPM | DIASTOLIC BLOOD PRESSURE: 86 MMHG | BODY MASS INDEX: 23.87 KG/M2 | WEIGHT: 145 LBS | HEIGHT: 65.5 IN | OXYGEN SATURATION: 99 % | SYSTOLIC BLOOD PRESSURE: 138 MMHG

## 2022-08-17 DIAGNOSIS — E87.1 HYPONATREMIA: ICD-10-CM

## 2022-08-17 DIAGNOSIS — R35.1 BENIGN PROSTATIC HYPERPLASIA WITH NOCTURIA: ICD-10-CM

## 2022-08-17 DIAGNOSIS — Z00.00 ENCOUNTER FOR ANNUAL HEALTH EXAMINATION: Primary | ICD-10-CM

## 2022-08-17 DIAGNOSIS — N40.1 BENIGN PROSTATIC HYPERPLASIA WITH NOCTURIA: ICD-10-CM

## 2022-08-17 DIAGNOSIS — H90.6 MIXED CONDUCTIVE AND SENSORINEURAL HEARING LOSS OF BOTH EARS: ICD-10-CM

## 2022-08-17 DIAGNOSIS — I10 ESSENTIAL HYPERTENSION: ICD-10-CM

## 2022-08-17 PROBLEM — M79.671 FOOT PAIN, RIGHT: Status: RESOLVED | Noted: 2018-10-04 | Resolved: 2022-08-17

## 2022-08-17 PROBLEM — B37.42 CANDIDAL BALANITIS: Status: RESOLVED | Noted: 2021-05-18 | Resolved: 2022-08-17

## 2022-08-17 LAB
ALBUMIN SERPL-MCNC: 4 G/DL (ref 3.4–5)
ALBUMIN/GLOB SERPL: 1.3 {RATIO} (ref 1–2)
ALP LIVER SERPL-CCNC: 62 U/L
ALT SERPL-CCNC: 19 U/L
ANION GAP SERPL CALC-SCNC: 2 MMOL/L (ref 0–18)
AST SERPL-CCNC: 18 U/L (ref 15–37)
BASOPHILS # BLD AUTO: 0.03 X10(3) UL (ref 0–0.2)
BASOPHILS NFR BLD AUTO: 0.5 %
BILIRUB SERPL-MCNC: 1 MG/DL (ref 0.1–2)
BUN BLD-MCNC: 7 MG/DL (ref 7–18)
CALCIUM BLD-MCNC: 9 MG/DL (ref 8.5–10.1)
CHLORIDE SERPL-SCNC: 100 MMOL/L (ref 98–112)
CO2 SERPL-SCNC: 28 MMOL/L (ref 21–32)
CREAT BLD-MCNC: 0.63 MG/DL
EOSINOPHIL # BLD AUTO: 0.06 X10(3) UL (ref 0–0.7)
EOSINOPHIL NFR BLD AUTO: 0.9 %
ERYTHROCYTE [DISTWIDTH] IN BLOOD BY AUTOMATED COUNT: 13 %
FASTING STATUS PATIENT QL REPORTED: YES
GFR SERPLBLD BASED ON 1.73 SQ M-ARVRAT: 94 ML/MIN/1.73M2 (ref 60–?)
GLOBULIN PLAS-MCNC: 3.1 G/DL (ref 2.8–4.4)
GLUCOSE BLD-MCNC: 90 MG/DL (ref 70–99)
HCT VFR BLD AUTO: 46.8 %
HGB BLD-MCNC: 16.1 G/DL
IMM GRANULOCYTES # BLD AUTO: 0.01 X10(3) UL (ref 0–1)
IMM GRANULOCYTES NFR BLD: 0.2 %
LYMPHOCYTES # BLD AUTO: 1.38 X10(3) UL (ref 1–4)
LYMPHOCYTES NFR BLD AUTO: 21.4 %
MCH RBC QN AUTO: 33.2 PG (ref 26–34)
MCHC RBC AUTO-ENTMCNC: 34.4 G/DL (ref 31–37)
MCV RBC AUTO: 96.5 FL
MONOCYTES # BLD AUTO: 0.76 X10(3) UL (ref 0.1–1)
MONOCYTES NFR BLD AUTO: 11.8 %
NEUTROPHILS # BLD AUTO: 4.21 X10 (3) UL (ref 1.5–7.7)
NEUTROPHILS # BLD AUTO: 4.21 X10(3) UL (ref 1.5–7.7)
NEUTROPHILS NFR BLD AUTO: 65.2 %
OSMOLALITY SERPL CALC.SUM OF ELEC: 268 MOSM/KG (ref 275–295)
PLATELET # BLD AUTO: 218 10(3)UL (ref 150–450)
POTASSIUM SERPL-SCNC: 3.7 MMOL/L (ref 3.5–5.1)
PROT SERPL-MCNC: 7.1 G/DL (ref 6.4–8.2)
RBC # BLD AUTO: 4.85 X10(6)UL
SODIUM SERPL-SCNC: 130 MMOL/L (ref 136–145)
TSI SER-ACNC: 2.22 MIU/ML (ref 0.36–3.74)
WBC # BLD AUTO: 6.5 X10(3) UL (ref 4–11)

## 2022-08-17 PROCEDURE — G0439 PPPS, SUBSEQ VISIT: HCPCS | Performed by: FAMILY MEDICINE

## 2022-08-17 PROCEDURE — 84443 ASSAY THYROID STIM HORMONE: CPT

## 2022-08-17 PROCEDURE — 99213 OFFICE O/P EST LOW 20 MIN: CPT | Performed by: FAMILY MEDICINE

## 2022-08-17 PROCEDURE — 80053 COMPREHEN METABOLIC PANEL: CPT

## 2022-08-17 PROCEDURE — 1126F AMNT PAIN NOTED NONE PRSNT: CPT | Performed by: FAMILY MEDICINE

## 2022-08-17 PROCEDURE — 36415 COLL VENOUS BLD VENIPUNCTURE: CPT

## 2022-08-17 PROCEDURE — 85025 COMPLETE CBC W/AUTO DIFF WBC: CPT

## 2022-08-17 NOTE — PATIENT INSTRUCTIONS
Good exam       Obtain labs today       [id number] Bernal for refills as needed.        Follow up appointment in 6 months for blood pressure check
no fever and no chills.

## 2022-08-24 ENCOUNTER — TELEPHONE (OUTPATIENT)
Dept: FAMILY MEDICINE CLINIC | Facility: CLINIC | Age: 82
End: 2022-08-24

## 2022-08-24 NOTE — TELEPHONE ENCOUNTER
----- Message from Radha Morelos MD sent at 8/24/2022 11:53 AM CDT -----  Labs reviewed. (Recent health check)Thyroid testing normal.Chemistry profile. Sodium low-relatively stable compared to previous couple years. Okay to add salt to the diet. Kidney function and liver function tests normal.Complete blood count normal.    Plan for follow-up visit in 6 months.

## 2022-08-24 NOTE — TELEPHONE ENCOUNTER
Pt's spouse reports pt is unable to talk on the phone due to being hard of hearing. Pt's spouse/Lulu on the verbal release form. Reza Mcpherson notified of Dr. Darryl Wagner. Eliezer's note and verbalized understanding. Encouraged to call back with any questions.

## 2022-09-19 RX ORDER — AMLODIPINE BESYLATE 5 MG/1
TABLET ORAL
Qty: 180 TABLET | Refills: 3 | Status: SHIPPED | OUTPATIENT
Start: 2022-09-19

## 2022-09-19 NOTE — TELEPHONE ENCOUNTER
Future appt: Your appointments     Date & Time Appointment Department [facility])    Feb 17, 2023  8:00 AM CST Follow up - Extended with Britt Landeros MD [address] [facility], [facility] ([place])            [facility]  [facility] [id number]  [phone]        Last Appointment with provider:   8/17/2022 for annual physical.  Last appointment at [facility]:  8/17/2022  Cholesterol, Total (mg/dL)   Date Value   04/28/2018 154     HDL Cholesterol (mg/dL)   Date Value   04/28/2018 58     LDL Cholesterol (mg/dL)   Date Value   04/28/2018 88     Triglycerides (mg/dL)   Date Value   04/28/2018 41     No results found for: EAG, A1C  Lab Results   Component Value Date    TSH 2.220 08/17/2022       No follow-ups on file.

## 2023-02-17 ENCOUNTER — OFFICE VISIT (OUTPATIENT)
Dept: FAMILY MEDICINE CLINIC | Facility: CLINIC | Age: 83
End: 2023-02-17
Payer: MEDICARE

## 2023-02-17 VITALS
DIASTOLIC BLOOD PRESSURE: 84 MMHG | TEMPERATURE: 99 F | HEART RATE: 76 BPM | OXYGEN SATURATION: 97 % | BODY MASS INDEX: 24.53 KG/M2 | SYSTOLIC BLOOD PRESSURE: 138 MMHG | WEIGHT: 149 LBS | HEIGHT: 65.5 IN | RESPIRATION RATE: 18 BRPM

## 2023-02-17 DIAGNOSIS — I10 ESSENTIAL HYPERTENSION: Primary | ICD-10-CM

## 2023-02-17 PROCEDURE — 99214 OFFICE O/P EST MOD 30 MIN: CPT | Performed by: FAMILY MEDICINE

## 2023-02-17 NOTE — PATIENT INSTRUCTIONS
Good exam      Labs look good       Continue with current medications.    [id number] [address] Bernal for refills as needed      Recheck in 6 months for annual check

## 2023-02-24 DIAGNOSIS — I10 ESSENTIAL HYPERTENSION: ICD-10-CM

## 2023-02-27 RX ORDER — ENALAPRIL MALEATE 20 MG/1
TABLET ORAL
Qty: 90 TABLET | Refills: 1 | Status: SHIPPED | OUTPATIENT
Start: 2023-02-27

## 2023-02-27 NOTE — TELEPHONE ENCOUNTER
Last Refill: 3/16/2022 #90 with 3 refills  Last Px: 8/17/2022  F/U Instructions: Return in 6 months    Future appt: Your appointments     Date & Time Appointment Department [facility])    Aug 18, 2023  9:00 AM CDT Follow Up Visit with MD Silvia Ortiz, [place]  ([facility])            Silvia Beyer, [facility] [place]  [place] [phone]  [phone]        Last Appointment with provider:   2/17/2023  Last appointment at [facility]:  2/17/2023  Cholesterol, Total (mg/dL)   Date Value   04/28/2018 154     HDL Cholesterol (mg/dL)   Date Value   04/28/2018 58     LDL Cholesterol (mg/dL)   Date Value   04/28/2018 88     Triglycerides (mg/dL)   Date Value   04/28/2018 41     No results found for: EAG, A1C  Lab Results   Component Value Date    TSH 2.220 08/17/2022       No follow-ups on file.

## 2023-04-30 DIAGNOSIS — I10 ESSENTIAL HYPERTENSION: ICD-10-CM

## 2023-05-01 NOTE — TELEPHONE ENCOUNTER
Last Refill: 4/12/2022 #0 with 3 refills  Last Px: 8/17/2022  F/U Instructions: Recheck in 6 months for annual check     Future appt: Your appointments     Date & Time Appointment Department [facility])    Aug 18, 2023  9:00 AM CDT Follow Up Visit with Jurgen Lombardo MD [address] [facility], [place] ([place])            [address] [facility], [facility] [place]  [place] [address] [phone]  [phone]        Last Appointment with provider:   2/17/2023  Last appointment at [facility]:  2/17/2023  Cholesterol, Total (mg/dL)   Date Value   04/28/2018 154     HDL Cholesterol (mg/dL)   Date Value   04/28/2018 58     LDL Cholesterol (mg/dL)   Date Value   04/28/2018 88     Triglycerides (mg/dL)   Date Value   04/28/2018 41     No results found for: EAG, A1C  Lab Results   Component Value Date    TSH 2.220 08/17/2022       No follow-ups on file.

## 2023-05-02 RX ORDER — HYDROCHLOROTHIAZIDE 25 MG/1
TABLET ORAL
Qty: 90 TABLET | Refills: 1 | Status: SHIPPED | OUTPATIENT
Start: 2023-05-02

## 2023-08-13 DIAGNOSIS — I10 ESSENTIAL HYPERTENSION: ICD-10-CM

## 2023-08-14 NOTE — TELEPHONE ENCOUNTER
Last Refill: 2//27/2023 #90 with 1 refill  Last Px: 8/17/2022  F/U Instructions: Recheck in 6 months for annual check      Future appt: Your appointments       Date & Time Appointment Department [facility])    Aug 18, 2023  8:45 AM CDT Medicare Annual Well Visit with Margaux Milligan MD [address] [facility], [place] ([facility])              [address] [facility], [place]  [facility] [address] [phone]  [phone]          Last Appointment with provider:   2/17/2023  [facility] [place] [facility]:  2/17/2023  Cholesterol, Total (mg/dL)   Date Value   04/28/2018 154     HDL Cholesterol (mg/dL)   Date Value   04/28/2018 58     LDL Cholesterol (mg/dL)   Date Value   04/28/2018 88     Triglycerides (mg/dL)   Date Value   04/28/2018 41     No results found for: EAG, A1C  Lab Results   Component Value Date    TSH 2.220 08/17/2022       No follow-ups on file.

## 2023-08-16 RX ORDER — ENALAPRIL MALEATE 20 MG/1
20 TABLET ORAL DAILY
Qty: 90 TABLET | Refills: 3 | Status: SHIPPED | OUTPATIENT
Start: 2023-08-16

## 2023-08-18 ENCOUNTER — LAB ENCOUNTER (OUTPATIENT)
Dept: LAB | Age: 83
End: 2023-08-18
Attending: FAMILY MEDICINE
Payer: MEDICARE

## 2023-08-18 ENCOUNTER — OFFICE VISIT (OUTPATIENT)
Dept: FAMILY MEDICINE CLINIC | Facility: CLINIC | Age: 83
End: 2023-08-18
Payer: MEDICARE

## 2023-08-18 VITALS
BODY MASS INDEX: 24.56 KG/M2 | HEIGHT: 65.5 IN | OXYGEN SATURATION: 95 % | TEMPERATURE: 97 F | WEIGHT: 149.19 LBS | HEART RATE: 58 BPM | SYSTOLIC BLOOD PRESSURE: 130 MMHG | RESPIRATION RATE: 18 BRPM | DIASTOLIC BLOOD PRESSURE: 72 MMHG

## 2023-08-18 DIAGNOSIS — I10 ESSENTIAL HYPERTENSION: ICD-10-CM

## 2023-08-18 DIAGNOSIS — H90.6 MIXED CONDUCTIVE AND SENSORINEURAL HEARING LOSS OF BOTH EARS: ICD-10-CM

## 2023-08-18 DIAGNOSIS — Z00.00 HEALTH CARE MAINTENANCE: Primary | ICD-10-CM

## 2023-08-18 DIAGNOSIS — R53.1 WEAKNESS: ICD-10-CM

## 2023-08-18 PROBLEM — N40.1 ENLARGED PROSTATE WITH LOWER URINARY TRACT SYMPTOMS (LUTS): Status: RESOLVED | Noted: 2022-01-13 | Resolved: 2023-08-18

## 2023-08-18 LAB
ALBUMIN SERPL-MCNC: 3.8 G/DL (ref 3.4–5)
ALBUMIN/GLOB SERPL: 1.2 {RATIO} (ref 1–2)
ALP LIVER SERPL-CCNC: 58 U/L
ALT SERPL-CCNC: 18 U/L
ANION GAP SERPL CALC-SCNC: 6 MMOL/L (ref 0–18)
AST SERPL-CCNC: 18 U/L (ref 15–37)
BASOPHILS # BLD AUTO: 0.03 X10(3) UL (ref 0–0.2)
BASOPHILS NFR BLD AUTO: 0.5 %
BILIRUB SERPL-MCNC: 1 MG/DL (ref 0.1–2)
BUN BLD-MCNC: 8 MG/DL (ref 7–18)
CALCIUM BLD-MCNC: 8.7 MG/DL (ref 8.5–10.1)
CHLORIDE SERPL-SCNC: 98 MMOL/L (ref 98–112)
CO2 SERPL-SCNC: 25 MMOL/L (ref 21–32)
CREAT BLD-MCNC: 0.66 MG/DL
EGFRCR SERPLBLD CKD-EPI 2021: 92 ML/MIN/1.73M2 (ref 60–?)
EOSINOPHIL # BLD AUTO: 0.07 X10(3) UL (ref 0–0.7)
EOSINOPHIL NFR BLD AUTO: 1.2 %
ERYTHROCYTE [DISTWIDTH] IN BLOOD BY AUTOMATED COUNT: 12.7 %
FASTING STATUS PATIENT QL REPORTED: YES
GLOBULIN PLAS-MCNC: 3.3 G/DL (ref 2.8–4.4)
GLUCOSE BLD-MCNC: 83 MG/DL (ref 70–99)
HCT VFR BLD AUTO: 48.6 %
HGB BLD-MCNC: 16.7 G/DL
IMM GRANULOCYTES # BLD AUTO: 0.04 X10(3) UL (ref 0–1)
IMM GRANULOCYTES NFR BLD: 0.7 %
LYMPHOCYTES # BLD AUTO: 1.24 X10(3) UL (ref 1–4)
LYMPHOCYTES NFR BLD AUTO: 21.1 %
MCH RBC QN AUTO: 32.7 PG (ref 26–34)
MCHC RBC AUTO-ENTMCNC: 34.4 G/DL (ref 31–37)
MCV RBC AUTO: 95.1 FL
MONOCYTES # BLD AUTO: 0.76 X10(3) UL (ref 0.1–1)
MONOCYTES NFR BLD AUTO: 12.9 %
NEUTROPHILS # BLD AUTO: 3.73 X10 (3) UL (ref 1.5–7.7)
NEUTROPHILS # BLD AUTO: 3.73 X10(3) UL (ref 1.5–7.7)
NEUTROPHILS NFR BLD AUTO: 63.6 %
OSMOLALITY SERPL CALC.SUM OF ELEC: 265 MOSM/KG (ref 275–295)
PLATELET # BLD AUTO: 209 10(3)UL (ref 150–450)
POTASSIUM SERPL-SCNC: 3.7 MMOL/L (ref 3.5–5.1)
PROT SERPL-MCNC: 7.1 G/DL (ref 6.4–8.2)
RBC # BLD AUTO: 5.11 X10(6)UL
SODIUM SERPL-SCNC: 129 MMOL/L (ref 136–145)
TSI SER-ACNC: 3.68 MIU/ML (ref 0.36–3.74)
WBC # BLD AUTO: 5.9 X10(3) UL (ref 4–11)

## 2023-08-18 PROCEDURE — 85025 COMPLETE CBC W/AUTO DIFF WBC: CPT

## 2023-08-18 PROCEDURE — 36415 COLL VENOUS BLD VENIPUNCTURE: CPT

## 2023-08-18 PROCEDURE — 84443 ASSAY THYROID STIM HORMONE: CPT

## 2023-08-18 PROCEDURE — 80053 COMPREHEN METABOLIC PANEL: CPT

## 2023-08-18 PROCEDURE — G0439 PPPS, SUBSEQ VISIT: HCPCS | Performed by: FAMILY MEDICINE

## 2023-08-18 PROCEDURE — 99213 OFFICE O/P EST LOW 20 MIN: CPT | Performed by: FAMILY MEDICINE

## 2023-08-18 PROCEDURE — 1126F AMNT PAIN NOTED NONE PRSNT: CPT | Performed by: FAMILY MEDICINE

## 2023-08-18 NOTE — PATIENT INSTRUCTIONS
[id number] [address] Bernal for refills     Skin of back :   Hydrocortisone every other day   Eucerin every other day     Consider physical therapy     Recheck in 3 months

## 2023-08-23 DIAGNOSIS — I10 ESSENTIAL HYPERTENSION: Primary | ICD-10-CM

## 2023-08-23 NOTE — TELEPHONE ENCOUNTER
Last Refill: 9/19/2022 #180 with 3 refills  Last Px: 8/18/2023  F/U Instructions: Recheck in 3 months     Future appt: Your appointments       Date & Time Appointment Department [facility])    Nov 20, 2023  9:30 AM CST Follow up - Extended with Graeme Draper MD [address] [place] [address], Emeka Wills ([facility])              [address] [facility]  [place] [phone]  [phone]          Last Appointment with provider:   8/18/2023  [facility] [place] [facility]:  8/18/2023  Cholesterol, Total (mg/dL)   Date Value   04/28/2018 154     HDL Cholesterol (mg/dL)   Date Value   04/28/2018 58     LDL Cholesterol (mg/dL)   Date Value   04/28/2018 88     Triglycerides (mg/dL)   Date Value   04/28/2018 41     No results found for: EAG, A1C  Lab Results   Component Value Date    TSH 3.680 08/18/2023       No follow-ups on file.

## 2023-08-24 RX ORDER — AMLODIPINE BESYLATE 5 MG/1
5 TABLET ORAL 2 TIMES DAILY
Qty: 180 TABLET | Refills: 3 | Status: SHIPPED | OUTPATIENT
Start: 2023-08-24

## 2025-01-24 NOTE — PATIENT INSTRUCTIONS
Anesthesia Post-op Note    Patient: Charity Herndon  Procedure(s) Performed: COLONOSCOPY  Anesthesia type: MAC    Vitals Value Taken Time   Temp 36.7 °C (98.1 °F) 01/24/25 1347   Pulse 67 01/24/25 1347   Resp 18 01/24/25 1347   SpO2 97 % 01/24/25 1347   BP 78/27 01/24/25 1347         Patient Location: Phase II  Post-op Vital Signs:stable  Level of Consciousness: sedated  Respiratory Status: spontaneous ventilation and nasal cannula  Cardiovascular stable  Hydration: euvolemic  Pain Management: adequately controlled  Handoff: Handoff to receiving clinician was performed and questions were answered  Vomiting: none  Nausea: None  Airway Patency:patent  Post-op Assessment: no complications and patient tolerated procedure well    No notable events documented.                       Good exam       Ok for refills       Recheck in August - after 08/16/21 - for physical/ annual check

## (undated) DIAGNOSIS — I10 ESSENTIAL HYPERTENSION: ICD-10-CM

## (undated) NOTE — MR AVS SNAPSHOT
[address]  [place] [id number] [phone]               Thank you for choosing us for your health care visit with Himanshu Edwards MD.  We are glad to serve you and happy to provide you with this summary of yo not sign up before the expiration date, you must request a new code. Your unique [employer] Access Code: [email or web address]  Expires: 5/9/2017 11:19 AM    If you have questions, you can call [phone] to talk to our [facility] Staff.  Remember, Radha i

## (undated) NOTE — LETTER
04/16/18        [address] [facility]      Dear Dajuan Moon records indicate that you have outstanding lab work and or testing that was ordered for you and has not yet been completed:          CBC W Differential W